# Patient Record
Sex: FEMALE | Race: WHITE | Employment: FULL TIME | URBAN - METROPOLITAN AREA
[De-identification: names, ages, dates, MRNs, and addresses within clinical notes are randomized per-mention and may not be internally consistent; named-entity substitution may affect disease eponyms.]

---

## 2021-09-26 ENCOUNTER — APPOINTMENT (OUTPATIENT)
Dept: GENERAL RADIOLOGY | Age: 46
DRG: 177 | End: 2021-09-26
Attending: EMERGENCY MEDICINE
Payer: COMMERCIAL

## 2021-09-26 ENCOUNTER — HOSPITAL ENCOUNTER (INPATIENT)
Age: 46
LOS: 3 days | Discharge: HOME | DRG: 177 | End: 2021-09-29
Attending: EMERGENCY MEDICINE | Admitting: FAMILY MEDICINE
Payer: COMMERCIAL

## 2021-09-26 DIAGNOSIS — U07.1 PNEUMONIA DUE TO COVID-19 VIRUS: Primary | ICD-10-CM

## 2021-09-26 DIAGNOSIS — J12.82 PNEUMONIA DUE TO COVID-19 VIRUS: Primary | ICD-10-CM

## 2021-09-26 DIAGNOSIS — R09.02 HYPOXIA: ICD-10-CM

## 2021-09-26 LAB
ALBUMIN SERPL-MCNC: 2.8 G/DL (ref 3.5–5)
ALBUMIN/GLOB SERPL: 0.7 {RATIO} (ref 1.2–3.5)
ALP SERPL-CCNC: 63 U/L (ref 50–136)
ALT SERPL-CCNC: 28 U/L (ref 12–65)
ANION GAP SERPL CALC-SCNC: 4 MMOL/L (ref 7–16)
AST SERPL-CCNC: 28 U/L (ref 15–37)
ATRIAL RATE: 102 BPM
BASOPHILS # BLD: 0 K/UL (ref 0–0.2)
BASOPHILS NFR BLD: 0 % (ref 0–2)
BILIRUB SERPL-MCNC: 0.3 MG/DL (ref 0.2–1.1)
BUN SERPL-MCNC: 9 MG/DL (ref 6–23)
CALCIUM SERPL-MCNC: 8.2 MG/DL (ref 8.3–10.4)
CALCULATED P AXIS, ECG09: 61 DEGREES
CALCULATED R AXIS, ECG10: -14 DEGREES
CALCULATED T AXIS, ECG11: 69 DEGREES
CHLORIDE SERPL-SCNC: 107 MMOL/L (ref 98–107)
CO2 SERPL-SCNC: 30 MMOL/L (ref 21–32)
CREAT SERPL-MCNC: 0.83 MG/DL (ref 0.6–1)
CRP SERPL-MCNC: 2.6 MG/DL (ref 0–0.9)
D DIMER PPP FEU-MCNC: 0.61 UG/ML(FEU)
DIAGNOSIS, 93000: NORMAL
DIFFERENTIAL METHOD BLD: ABNORMAL
EOSINOPHIL # BLD: 0 K/UL (ref 0–0.8)
EOSINOPHIL NFR BLD: 0 % (ref 0.5–7.8)
ERYTHROCYTE [DISTWIDTH] IN BLOOD BY AUTOMATED COUNT: 16.1 % (ref 11.9–14.6)
GLOBULIN SER CALC-MCNC: 4 G/DL (ref 2.3–3.5)
GLUCOSE SERPL-MCNC: 99 MG/DL (ref 65–100)
HCT VFR BLD AUTO: 39.7 % (ref 35.8–46.3)
HGB BLD-MCNC: 12 G/DL (ref 11.7–15.4)
IMM GRANULOCYTES # BLD AUTO: 0 K/UL (ref 0–0.5)
IMM GRANULOCYTES NFR BLD AUTO: 0 % (ref 0–5)
LYMPHOCYTES # BLD: 0.8 K/UL (ref 0.5–4.6)
LYMPHOCYTES NFR BLD: 28 % (ref 13–44)
MCH RBC QN AUTO: 24.1 PG (ref 26.1–32.9)
MCHC RBC AUTO-ENTMCNC: 30.2 G/DL (ref 31.4–35)
MCV RBC AUTO: 79.9 FL (ref 79.6–97.8)
MONOCYTES # BLD: 0.2 K/UL (ref 0.1–1.3)
MONOCYTES NFR BLD: 7 % (ref 4–12)
NEUTS SEG # BLD: 1.8 K/UL (ref 1.7–8.2)
NEUTS SEG NFR BLD: 64 % (ref 43–78)
NRBC # BLD: 0 K/UL (ref 0–0.2)
P-R INTERVAL, ECG05: 142 MS
PLATELET # BLD AUTO: 197 K/UL (ref 150–450)
PMV BLD AUTO: 9.5 FL (ref 9.4–12.3)
POTASSIUM SERPL-SCNC: 4.2 MMOL/L (ref 3.5–5.1)
PROCALCITONIN SERPL-MCNC: <0.05 NG/ML
PROT SERPL-MCNC: 6.8 G/DL (ref 6.3–8.2)
Q-T INTERVAL, ECG07: 320 MS
QRS DURATION, ECG06: 76 MS
QTC CALCULATION (BEZET), ECG08: 417 MS
RBC # BLD AUTO: 4.97 M/UL (ref 4.05–5.2)
SARS-COV-2, COV2: DETECTED
SARS-COV-2, COV2: NORMAL
SODIUM SERPL-SCNC: 141 MMOL/L (ref 136–145)
SPECIMEN SOURCE, FCOV2M: ABNORMAL
VENTRICULAR RATE, ECG03: 102 BPM
WBC # BLD AUTO: 2.8 K/UL (ref 4.3–11.1)

## 2021-09-26 PROCEDURE — 74011250636 HC RX REV CODE- 250/636: Performed by: FAMILY MEDICINE

## 2021-09-26 PROCEDURE — 85025 COMPLETE CBC W/AUTO DIFF WBC: CPT

## 2021-09-26 PROCEDURE — 94762 N-INVAS EAR/PLS OXIMTRY CONT: CPT

## 2021-09-26 PROCEDURE — 85379 FIBRIN DEGRADATION QUANT: CPT

## 2021-09-26 PROCEDURE — 65270000029 HC RM PRIVATE

## 2021-09-26 PROCEDURE — 99285 EMERGENCY DEPT VISIT HI MDM: CPT

## 2021-09-26 PROCEDURE — 74011250636 HC RX REV CODE- 250/636: Performed by: EMERGENCY MEDICINE

## 2021-09-26 PROCEDURE — 86140 C-REACTIVE PROTEIN: CPT

## 2021-09-26 PROCEDURE — 84145 PROCALCITONIN (PCT): CPT

## 2021-09-26 PROCEDURE — 93005 ELECTROCARDIOGRAM TRACING: CPT | Performed by: EMERGENCY MEDICINE

## 2021-09-26 PROCEDURE — 36415 COLL VENOUS BLD VENIPUNCTURE: CPT

## 2021-09-26 PROCEDURE — 96374 THER/PROPH/DIAG INJ IV PUSH: CPT

## 2021-09-26 PROCEDURE — 77010033711 HC HIGH FLOW OXYGEN

## 2021-09-26 PROCEDURE — 71045 X-RAY EXAM CHEST 1 VIEW: CPT

## 2021-09-26 PROCEDURE — U0005 INFEC AGEN DETEC AMPLI PROBE: HCPCS

## 2021-09-26 PROCEDURE — 80053 COMPREHEN METABOLIC PANEL: CPT

## 2021-09-26 PROCEDURE — 74011250637 HC RX REV CODE- 250/637: Performed by: FAMILY MEDICINE

## 2021-09-26 RX ORDER — SODIUM CHLORIDE 0.9 % (FLUSH) 0.9 %
5-40 SYRINGE (ML) INJECTION AS NEEDED
Status: DISCONTINUED | OUTPATIENT
Start: 2021-09-26 | End: 2021-09-29 | Stop reason: HOSPADM

## 2021-09-26 RX ORDER — ONDANSETRON 2 MG/ML
4 INJECTION INTRAMUSCULAR; INTRAVENOUS
Status: DISCONTINUED | OUTPATIENT
Start: 2021-09-26 | End: 2021-09-29 | Stop reason: HOSPADM

## 2021-09-26 RX ORDER — POLYETHYLENE GLYCOL 3350 17 G/17G
17 POWDER, FOR SOLUTION ORAL DAILY PRN
Status: DISCONTINUED | OUTPATIENT
Start: 2021-09-26 | End: 2021-09-29 | Stop reason: HOSPADM

## 2021-09-26 RX ORDER — DEXAMETHASONE SODIUM PHOSPHATE 100 MG/10ML
6 INJECTION INTRAMUSCULAR; INTRAVENOUS
Status: COMPLETED | OUTPATIENT
Start: 2021-09-26 | End: 2021-09-26

## 2021-09-26 RX ORDER — DEXAMETHASONE 4 MG/1
6 TABLET ORAL DAILY
Status: DISCONTINUED | OUTPATIENT
Start: 2021-09-26 | End: 2021-09-29 | Stop reason: HOSPADM

## 2021-09-26 RX ORDER — ONDANSETRON 4 MG/1
4 TABLET, ORALLY DISINTEGRATING ORAL
Status: DISCONTINUED | OUTPATIENT
Start: 2021-09-26 | End: 2021-09-29 | Stop reason: HOSPADM

## 2021-09-26 RX ORDER — ACETAMINOPHEN 650 MG/1
650 SUPPOSITORY RECTAL
Status: DISCONTINUED | OUTPATIENT
Start: 2021-09-26 | End: 2021-09-29 | Stop reason: HOSPADM

## 2021-09-26 RX ORDER — ENOXAPARIN SODIUM 100 MG/ML
40 INJECTION SUBCUTANEOUS EVERY 12 HOURS
Status: DISCONTINUED | OUTPATIENT
Start: 2021-09-26 | End: 2021-09-29 | Stop reason: HOSPADM

## 2021-09-26 RX ORDER — ACETAMINOPHEN 325 MG/1
650 TABLET ORAL
Status: DISCONTINUED | OUTPATIENT
Start: 2021-09-26 | End: 2021-09-29 | Stop reason: HOSPADM

## 2021-09-26 RX ORDER — SODIUM CHLORIDE 0.9 % (FLUSH) 0.9 %
5-40 SYRINGE (ML) INJECTION EVERY 8 HOURS
Status: DISCONTINUED | OUTPATIENT
Start: 2021-09-26 | End: 2021-09-29 | Stop reason: HOSPADM

## 2021-09-26 RX ADMIN — ENOXAPARIN SODIUM 40 MG: 40 INJECTION SUBCUTANEOUS at 16:56

## 2021-09-26 RX ADMIN — ACETAMINOPHEN 650 MG: 325 TABLET ORAL at 13:04

## 2021-09-26 RX ADMIN — Medication 10 ML: at 21:52

## 2021-09-26 RX ADMIN — DEXAMETHASONE 6 MG: 4 TABLET ORAL at 16:56

## 2021-09-26 RX ADMIN — DEXAMETHASONE SODIUM PHOSPHATE 6 MG: 10 INJECTION INTRAMUSCULAR; INTRAVENOUS at 11:15

## 2021-09-26 NOTE — ED PROVIDER NOTES
28-year-old female presents with increased shortness of breath with exertion related to COVID-19. She was diagnosed September 19 for severe home testing done at work. She does not have a copy of the results. She reports continued cough, generalized weakness, diarrhea, and chest heaviness. She has not been vaccinated. She reports oxygen saturations have been in the low 80s since yesterday. Positive For Covid-19  Associated symptoms: chest pain, congestion, cough, diarrhea and myalgias    Associated symptoms: no confusion, no headaches, no nausea, no rash and no vomiting         No past medical history on file. No past surgical history on file. No family history on file. Social History     Socioeconomic History    Marital status: Not on file     Spouse name: Not on file    Number of children: Not on file    Years of education: Not on file    Highest education level: Not on file   Occupational History    Not on file   Tobacco Use    Smoking status: Not on file   Substance and Sexual Activity    Alcohol use: Not on file    Drug use: Not on file    Sexual activity: Not on file   Other Topics Concern    Not on file   Social History Narrative    Not on file     Social Determinants of Health     Financial Resource Strain:     Difficulty of Paying Living Expenses:    Food Insecurity:     Worried About Running Out of Food in the Last Year:     [address] in the Last Year:    Transportation Needs:     Lack of Transportation (Medical):      Lack of Transportation (Non-Medical):    Physical Activity:     Days of Exercise per Week:     Minutes of Exercise per Session:    Stress:     Feeling of Stress :    Social Connections:     Frequency of Communication with Friends and Family:     Frequency of Social Gatherings with Friends and Family:     Attends Rastafarian Services:     Active Member of Clubs or Organizations:     Attends Club or Organization Meetings:     Marital Status: Intimate Partner Violence:     Fear of Current or Ex-Partner:     Emotionally Abused:     Physically Abused:     Sexually Abused: ALLERGIES: Keflex [cephalexin]    Review of Systems   Constitutional: Positive for fatigue. Negative for fever. HENT: Positive for congestion. Negative for hearing loss. Eyes: Negative for visual disturbance. Respiratory: Positive for cough and shortness of breath. Cardiovascular: Positive for chest pain. Gastrointestinal: Positive for diarrhea. Negative for abdominal pain, nausea and vomiting. Musculoskeletal: Positive for myalgias. Negative for back pain. Skin: Negative for rash. Neurological: Negative for headaches. Psychiatric/Behavioral: Negative for confusion. All other systems reviewed and are negative. Vitals:    09/26/21 1026 09/26/21 1028   BP: (!) 165/75    Pulse: (!) 109    Resp: 20    SpO2: (!) 86% 93%   Weight: 127 kg (280 lb)    Height: 5' 4\" (1.626 m)             Physical Exam  Vitals and nursing note reviewed. Constitutional:       Appearance: Normal appearance. She is obese. HENT:      Head: Normocephalic and atraumatic. Nose: Nose normal.      Mouth/Throat:      Mouth: Mucous membranes are moist.   Eyes:      Pupils: Pupils are equal, round, and reactive to light. Cardiovascular:      Rate and Rhythm: Regular rhythm. Tachycardia present. Pulmonary:      Effort: Pulmonary effort is normal.      Breath sounds: No stridor. Abdominal:      General: Abdomen is flat. Musculoskeletal:         General: No deformity. Normal range of motion. Cervical back: Normal range of motion and neck supple. Skin:     General: Skin is warm and dry. Neurological:      General: No focal deficit present. Mental Status: She is alert. Mental status is at baseline.    Psychiatric:         Mood and Affect: Mood normal.         Behavior: Behavior normal.          MDM  Number of Diagnoses or Management Options  Diagnosis management comments: Parts of this document were created using dragon voice recognition software. The chart has been reviewed but errors may still be present. I wore appropriate PPE throughout this patient's ED visit. Germán Aguilar MD, 11:04 AM    Given decadron. COVID swab ordered as she does not have a copy of report. Will admit for hypoxia. sats 86% upon arrival.       Amount and/or Complexity of Data Reviewed  Clinical lab tests: reviewed and ordered  Tests in the radiology section of CPT®: ordered and reviewed (XR CHEST PORT    Result Date: 9/26/2021  CHEST X-RAY, single portable view  9/26/2021 History: Covid positive. Shortness of breath and hypoxia. Technique: Single frontal view of the chest. Comparison: None. Findings: The cardiac silhouette is normal in respect to size. The lungs are expanded without evidence for pneumothorax. Mild multifocal infiltrates are seen in the lower lung fields, left greater than right, which favor the periphery of the lungs. 1.  Mild multifocal infiltrates in the lower lung fields, left greater than right, which favor the periphery of the lungs consistent with the history of Covid pneumonia. This report was made using voice transcription.  Despite my best efforts to avoid any, transcription errors may persist. If there is any question about the accuracy of the report or need for clarification, then please call [phone], or text me through perfectserv for clarification or correction.     )  Tests in the medicine section of CPT®: ordered and reviewed           Procedures

## 2021-09-26 NOTE — ED TRIAGE NOTES
Pt arrived via EMS from home with c/o COVID sx since last week. Pt reports dx on Sunday. Pt reports shob and weakness.  SpO 90% /70 HR 96 Temp 99.5

## 2021-09-26 NOTE — H&P
[facility] Hospitalist Note     Admit Date:  2021 10:29 AM   Name:  Thmopson Solano   Age:  42 y.o.  :  1975   MRN:  [id number]   PCP:  None  Treatment Team: Attending Provider: Meño Zepeda MD; Primary Nurse: Federico Romo RN    HPI/Subjective:   Patient is a 73-year-old female with no significant past medical history presented to ED with worsening shortness of breath. Patient reports symptoms started about a week ago and she was diagnosed with Covid on . She did not have a copy of the results. She has not been vaccinated. Complaining of generalized weakness, cough, congestion, chest tightness and diarrhea. Shortness of breath worsened today and she noted to have oxygen saturation at home in low 80s. Denies fever, chills, nausea, vomiting or abdominal pain. In the ED patient noted to have oxygen saturation 86% on room air, requiring 2 L nasal cannula to keep oxygen saturation above 90%. Labs noted for WBC 12.8, hemoglobin 12, platelet 650, BMP unremarkable. Procalcitonin negative, CRP 2.6. Chest x-ray read mild multifocal infiltrates in the lower lung fields left greater than right. Hospitalist consulted for admission. Assessment and Plan:     Acute hypoxia secondary to COVID-19 infection:  Symptoms started on   Follow-up on COVID-19 PCR  Oxygen saturation 86% on room air, requiring 2 L nasal cannula  Supplemental oxygen as needed  Start patient on Decadron for 10 days  Out of window for remdesivir  CRP 2.6, will check other inflammatory markers. If elevated, will consider baricitinib  Check D-dimer  Procalcitonin negative, hold off on antibiotics  Incentive spirometry    Morbid obesity, BMI 48:   Adds complexity inpatient care    Discharge planning: Admit patient to inpatient    DVT ppx ordered  Code status:  Full  Estimated LOS:  Greater than 2 midnights  Risk:  high    Hospital Problems as of 2021 Never Reviewed        Codes Class Noted - Resolved POA    Hypoxia ICD-10-CM: R09.02  ICD-9-CM: 799.02  9/26/2021 - Present Unknown        COVID-19 ICD-10-CM: U07.1  ICD-9-CM: 079.89  9/26/2021 - Present Unknown        BMI 45.0-49.9, adult [facility]) ICD-10-CM: V10.41  ICD-9-CM: V85.42  9/26/2021 - Present Unknown                10 systems reviewed and negative except as noted in HPI. No past medical history on file. No past surgical history on file. Allergies   Allergen Reactions    Keflex [Cephalexin] Rash      Social History     Tobacco Use    Smoking status: Not on file   Substance Use Topics    Alcohol use: Not on file      No family history on file. Family history reviewed and noncontributory. There is no immunization history on file for this patient. PTA Medications:  None       Objective:     Patient Vitals for the past 24 hrs:   Pulse Resp BP SpO2   09/26/21 1332 (!) 103  139/65 95 %   09/26/21 1202 94  (!) 147/76 95 %   09/26/21 1132 91  (!) 145/68 97 %   09/26/21 1102 98 26 132/65 92 %   09/26/21 1032 (!) 108 19 136/68 96 %   09/26/21 1028    93 %   09/26/21 1026 (!) 109 20 (!) 165/75 (!) 86 %     Oxygen Therapy  O2 Sat (%): 95 % (09/26/21 1332)  Pulse via Oximetry: 101 beats per minute (09/26/21 1332)  O2 Device: Nasal cannula (09/26/21 1028)  O2 Flow Rate (L/min): 2 l/min (09/26/21 1028)    Estimated body mass index is 48.06 kg/m² as calculated from the following:    Height as of this encounter: 5' 4\" (1.626 m). Weight as of this encounter: 127 kg (280 lb). No intake or output data in the 24 hours ending 09/26/21 1404    *Note that automatically entered I/Os may not be accurate; dependent on patient compliance with collection and accurate  by assistants. Physical Exam:  General:    Well nourished. Alert. Eyes:   Normal sclerae. Extraocular movements intact. HENT:  Normocephalic, atraumatic. Moist mucous membranes  CV:   RRR. No m/r/g. Lungs:  Decreased breath sounds at the bases  Abdomen: Soft, nontender, nondistended. Extremities: Warm and dry. No cyanosis or edema. Neurologic: CN II-XII grossly intact. Sensation intact. Skin:     No rashes or jaundice. Normal coloration  Psych:  Normal mood and affect. I reviewed the labs, imaging, EKGs, telemetry, and other studies done this admission. Data Reviewed:   Recent Results (from the past 24 hour(s))   CBC WITH AUTOMATED DIFF    Collection Time: 09/26/21 10:31 AM   Result Value Ref Range    WBC 2.8 (L) 4.3 - 11.1 K/uL    RBC 4.97 4.05 - 5.2 M/uL    HGB 12.0 11.7 - 15.4 g/dL    HCT 39.7 35.8 - 46.3 %    MCV 79.9 79.6 - 97.8 FL    MCH 24.1 (L) 26.1 - 32.9 PG    MCHC 30.2 (L) 31.4 - 35.0 g/dL    RDW 16.1 (H) 11.9 - 14.6 %    PLATELET 158 580 - 268 K/uL    MPV 9.5 9.4 - 12.3 FL    ABSOLUTE NRBC 0.00 0.0 - 0.2 K/uL    DF AUTOMATED      NEUTROPHILS 64 43 - 78 %    LYMPHOCYTES 28 13 - 44 %    MONOCYTES 7 4.0 - 12.0 %    EOSINOPHILS 0 (L) 0.5 - 7.8 %    BASOPHILS 0 0.0 - 2.0 %    IMMATURE GRANULOCYTES 0 0.0 - 5.0 %    ABS. NEUTROPHILS 1.8 1.7 - 8.2 K/UL    ABS. LYMPHOCYTES 0.8 0.5 - 4.6 K/UL    ABS. MONOCYTES 0.2 0.1 - 1.3 K/UL    ABS. EOSINOPHILS 0.0 0.0 - 0.8 K/UL    ABS. BASOPHILS 0.0 0.0 - 0.2 K/UL    ABS. IMM. GRANS. 0.0 0.0 - 0.5 K/UL   METABOLIC PANEL, COMPREHENSIVE    Collection Time: 09/26/21 10:31 AM   Result Value Ref Range    Sodium 141 136 - 145 mmol/L    Potassium 4.2 3.5 - 5.1 mmol/L    Chloride 107 98 - 107 mmol/L    CO2 30 21 - 32 mmol/L    Anion gap 4 (L) 7 - 16 mmol/L    Glucose 99 65 - 100 mg/dL    BUN 9 6 - 23 MG/DL    Creatinine 0.83 0.6 - 1.0 MG/DL    GFR est AA >60 >60 ml/min/1.73m2    GFR est non-AA >60 >60 ml/min/1.73m2    Calcium 8.2 (L) 8.3 - 10.4 MG/DL    Bilirubin, total 0.3 0.2 - 1.1 MG/DL    ALT (SGPT) 28 12 - 65 U/L    AST (SGOT) 28 15 - 37 U/L    Alk.  phosphatase 63 50 - 136 U/L    Protein, total 6.8 6.3 - 8.2 g/dL    Albumin 2.8 (L) 3.5 - 5.0 g/dL    Globulin 4.0 (H) 2.3 - 3.5 g/dL    A-G Ratio 0.7 (L) 1.2 - 3.5     C REACTIVE PROTEIN, QT Collection Time: 09/26/21 10:31 AM   Result Value Ref Range    C-Reactive protein 2.6 (H) 0.0 - 0.9 mg/dL   PROCALCITONIN    Collection Time: 09/26/21 10:31 AM   Result Value Ref Range    Procalcitonin <0.05 ng/mL   SARS-COV-2    Collection Time: 09/26/21 11:16 AM   Result Value Ref Range    SARS-CoV-2 Please find results under separate order         All Micro Results     Procedure Component Value Units Date/Time    SARS-COV-2, PCR [[id number]] Collected: 09/26/21 1116    Order Status: Completed Updated: 09/26/21 1238          Current Facility-Administered Medications   Medication Dose Route Frequency    acetaminophen (TYLENOL) tablet 650 mg  650 mg Oral Q6H PRN    Or    acetaminophen (TYLENOL) suppository 650 mg  650 mg Rectal Q6H PRN    remdesivir 200 mg in 0.9% sodium chloride 250 mL IVPB  200 mg IntraVENous ONCE    Followed by   Jeanmarie Munguia ON 9/27/2021] remdesivir 100 mg in 0.9% sodium chloride 250 mL IVPB  100 mg IntraVENous Q24H     No current outpatient medications on file. Other Studies:  No results found for this visit on 09/26/21. XR CHEST PORT    Result Date: 9/26/2021  CHEST X-RAY, single portable view  9/26/2021 History: Covid positive. Shortness of breath and hypoxia. Technique: Single frontal view of the chest. Comparison: None. Findings: The cardiac silhouette is normal in respect to size. The lungs are expanded without evidence for pneumothorax. Mild multifocal infiltrates are seen in the lower lung fields, left greater than right, which favor the periphery of the lungs. 1.  Mild multifocal infiltrates in the lower lung fields, left greater than right, which favor the periphery of the lungs consistent with the history of Covid pneumonia. This report was made using voice transcription.  Despite my best efforts to avoid any, transcription errors may persist. If there is any question about the accuracy of the report or need for clarification, then please call [phone], or text me through perfectserv for clarification or correction. [unfilled]       Part of this note was written by using a voice dictation software and the note has been proof read but may still contain some grammatical/other typographical errors.     Signed:  Sylvester Nava MD

## 2021-09-26 NOTE — PROGRESS NOTES
TRANSFER - IN REPORT:    Verbal report received from CLYDE Singer on Vladimire Adam  being received from ED for routine progression of care      Report consisted of patients Situation, Background, Assessment and   Recommendations(SBAR). Information from the following report(s) SBAR was reviewed with the receiving nurse. Opportunity for questions and clarification was provided. Assessment completed upon patients arrival to unit and care assumed.

## 2021-09-26 NOTE — PROGRESS NOTES
INITIAL SPIRITUAL ASSESSMENT     NOTED:      PATIENT IS BEING ADMITTED TO FLOOR FROM ER    [facility]    SISTER -  Aubrey Fay        WILL ASSESS HOW WE CAN BEST SERVE THIS FAMILY

## 2021-09-26 NOTE — ED NOTES
TRANSFER - OUT REPORT:    Verbal report given to Missael Bernstein RN on Maria Luisa Andrade  being transferred to [facility] for routine progression of care       Report consisted of patients Situation, Background, Assessment and   Recommendations(SBAR). Information from the following report(s) SBAR was reviewed with the receiving nurse. Lines:   Peripheral IV 09/26/21 Right Antecubital (Active)        Opportunity for questions and clarification was provided.       Patient transported with:   [email or web address]

## 2021-09-26 NOTE — PROGRESS NOTES
Pt awake in bed watching TV with 2LNC sating 93%. In NAD. Denies concerns/pain/complaints at this time.  Report to be given to night RN

## 2021-09-27 PROBLEM — J96.01 ACUTE HYPOXEMIC RESPIRATORY FAILURE DUE TO COVID-19 (HCC): Status: ACTIVE | Noted: 2021-09-27

## 2021-09-27 PROBLEM — U07.1 ACUTE HYPOXEMIC RESPIRATORY FAILURE DUE TO COVID-19 (HCC): Status: ACTIVE | Noted: 2021-09-27

## 2021-09-27 LAB
ALBUMIN SERPL-MCNC: 2.7 G/DL (ref 3.5–5)
ALBUMIN/GLOB SERPL: 0.7 {RATIO} (ref 1.2–3.5)
ALP SERPL-CCNC: 59 U/L (ref 50–136)
ALT SERPL-CCNC: 26 U/L (ref 12–65)
ANION GAP SERPL CALC-SCNC: 6 MMOL/L (ref 7–16)
AST SERPL-CCNC: 26 U/L (ref 15–37)
BASOPHILS # BLD: 0 K/UL (ref 0–0.2)
BASOPHILS NFR BLD: 0 % (ref 0–2)
BILIRUB SERPL-MCNC: 0.3 MG/DL (ref 0.2–1.1)
BUN SERPL-MCNC: 12 MG/DL (ref 6–23)
CALCIUM SERPL-MCNC: 8.3 MG/DL (ref 8.3–10.4)
CHLORIDE SERPL-SCNC: 107 MMOL/L (ref 98–107)
CO2 SERPL-SCNC: 27 MMOL/L (ref 21–32)
CREAT SERPL-MCNC: 0.71 MG/DL (ref 0.6–1)
CRP SERPL-MCNC: 2.1 MG/DL (ref 0–0.9)
D DIMER PPP FEU-MCNC: 0.37 UG/ML(FEU)
DIFFERENTIAL METHOD BLD: ABNORMAL
EOSINOPHIL # BLD: 0 K/UL (ref 0–0.8)
EOSINOPHIL NFR BLD: 0 % (ref 0.5–7.8)
ERYTHROCYTE [DISTWIDTH] IN BLOOD BY AUTOMATED COUNT: 15.9 % (ref 11.9–14.6)
FERRITIN SERPL-MCNC: 55 NG/ML (ref 8–388)
FIBRINOGEN PPP-MCNC: 462 MG/DL (ref 190–501)
GLOBULIN SER CALC-MCNC: 4.1 G/DL (ref 2.3–3.5)
GLUCOSE SERPL-MCNC: 119 MG/DL (ref 65–100)
HCT VFR BLD AUTO: 40.3 % (ref 35.8–46.3)
HGB BLD-MCNC: 12.3 G/DL (ref 11.7–15.4)
IMM GRANULOCYTES # BLD AUTO: 0 K/UL (ref 0–0.5)
IMM GRANULOCYTES NFR BLD AUTO: 0 % (ref 0–5)
INR PPP: 0.9
LYMPHOCYTES # BLD: 0.6 K/UL (ref 0.5–4.6)
LYMPHOCYTES NFR BLD: 23 % (ref 13–44)
MCH RBC QN AUTO: 24 PG (ref 26.1–32.9)
MCHC RBC AUTO-ENTMCNC: 30.5 G/DL (ref 31.4–35)
MCV RBC AUTO: 78.7 FL (ref 79.6–97.8)
MONOCYTES # BLD: 0.2 K/UL (ref 0.1–1.3)
MONOCYTES NFR BLD: 8 % (ref 4–12)
NEUTS SEG # BLD: 1.8 K/UL (ref 1.7–8.2)
NEUTS SEG NFR BLD: 69 % (ref 43–78)
NRBC # BLD: 0 K/UL (ref 0–0.2)
PLATELET # BLD AUTO: 240 K/UL (ref 150–450)
PMV BLD AUTO: 9.4 FL (ref 9.4–12.3)
POTASSIUM SERPL-SCNC: 4 MMOL/L (ref 3.5–5.1)
PROT SERPL-MCNC: 6.8 G/DL (ref 6.3–8.2)
PROTHROMBIN TIME: 12.1 SEC (ref 12.6–14.5)
RBC # BLD AUTO: 5.12 M/UL (ref 4.05–5.2)
SODIUM SERPL-SCNC: 140 MMOL/L (ref 136–145)
WBC # BLD AUTO: 2.6 K/UL (ref 4.3–11.1)

## 2021-09-27 PROCEDURE — 36415 COLL VENOUS BLD VENIPUNCTURE: CPT

## 2021-09-27 PROCEDURE — 65270000029 HC RM PRIVATE

## 2021-09-27 PROCEDURE — 74011250636 HC RX REV CODE- 250/636: Performed by: FAMILY MEDICINE

## 2021-09-27 PROCEDURE — 85025 COMPLETE CBC W/AUTO DIFF WBC: CPT

## 2021-09-27 PROCEDURE — 74011250637 HC RX REV CODE- 250/637: Performed by: FAMILY MEDICINE

## 2021-09-27 PROCEDURE — 80053 COMPREHEN METABOLIC PANEL: CPT

## 2021-09-27 PROCEDURE — 85379 FIBRIN DEGRADATION QUANT: CPT

## 2021-09-27 PROCEDURE — 85384 FIBRINOGEN ACTIVITY: CPT

## 2021-09-27 PROCEDURE — 82728 ASSAY OF FERRITIN: CPT

## 2021-09-27 PROCEDURE — 85610 PROTHROMBIN TIME: CPT

## 2021-09-27 PROCEDURE — 86140 C-REACTIVE PROTEIN: CPT

## 2021-09-27 RX ADMIN — ENOXAPARIN SODIUM 40 MG: 40 INJECTION SUBCUTANEOUS at 15:44

## 2021-09-27 RX ADMIN — Medication 10 ML: at 05:29

## 2021-09-27 RX ADMIN — Medication 10 ML: at 21:55

## 2021-09-27 RX ADMIN — ACETAMINOPHEN 650 MG: 325 TABLET ORAL at 18:26

## 2021-09-27 RX ADMIN — ENOXAPARIN SODIUM 40 MG: 40 INJECTION SUBCUTANEOUS at 03:19

## 2021-09-27 RX ADMIN — Medication 10 ML: at 13:02

## 2021-09-27 RX ADMIN — DEXAMETHASONE 6 MG: 4 TABLET ORAL at 13:04

## 2021-09-27 NOTE — ACP (ADVANCE CARE PLANNING)
Chart reviewed by ANDERSON KRAMER for ACP. Per chart review, no HCPOA or advance directive on file. Patient's NOK is spouse Britney Ma, who is currently incarcerated. Patient's preferred health decision maker is sister Yogesh Friday [phone]. ACP dicussion completed. Advance Care Planning     Advance Care Planning Activator (Inpatient)  Conversation Note      Date of ACP Conversation: 09/27/21     Conversation Conducted with:   Patient with Decision Making Capacity    ACP Activator: Alex Decision Maker:    Preferred Decision Maker is : Yogesh Friday- Sister [phone]. Click here to complete [address] including selection of the Healthcare Decision Maker Relationship (ie \"Primary\")  Today we documented desired Decision Maker(s), who is (are) NOT Legal Next of Kin. ACP documents are required for decision maker authority. Care Preferences    Ventilation: \"If you were in your present state of health and suddenly became very ill and were unable to breathe on your own, what would your preference be about the use of a ventilator (breathing machine) if it were available to you? \"      If patient would desire the use of a ventilator (breathing machine), answer \"yes\", if not \"no\":yes    \"If your health worsens and it becomes clear that your chance of recovery is unlikely, what would your preference be about the use of a ventilator (breathing machine) if it were available to you? \"     Would the patient desire the use of a ventilator (breathing machine)? YES      Resuscitation  \"CPR works best to restart the heart when there is a sudden event, like a heart attack, in someone who is otherwise healthy. Unfortunately, CPR does not typically restart the heart for people who have serious health conditions or who are very sick. \"    \"In the event your heart stopped as a result of an underlying serious health condition, would you want attempts to be made to restart your heart (answer \"yes\" for attempt to resuscitate) or would you prefer a natural death (answer \"no\" for do not attempt to resuscitate)? \" yes      [x] Yes  [] No   Educated Patient / Monica Geiger regarding differences between Advance Directives and portable DNR orders.     Length of ACP Conversation in minutes:  15    Conversation Outcomes:  [x] ACP discussion completed  [] Existing advance directive reviewed with patient; no changes to patient's previously recorded wishes     [] New Advance Directive completed   [] Portable Do Not Resuscitate prepared for Provider review and signature  [] POLST/POST/MOLST/MOST prepared for Provider review and signature      Follow-up plan:    [] Schedule follow-up conversation to continue planning  [] Referred individual to Provider for additional questions/concerns   [] Advised patient/agent/surrogate to review completed ACP document and update if needed with changes in condition, patient preferences or care setting     [x] This note routed to one or more involved healthcare providers

## 2021-09-27 NOTE — ROUTINE PROCESS
Bedside and Verbal and Written shift change report given to Roly Tran RN (oncoming nurse) by self Aquiles Ha nurse). Report included the following information SBAR, Kardex, Intake/Output, MAR and Recent Results.    Pt remains on 3L NC.

## 2021-09-27 NOTE — ROUTINE PROCESS
Bedside and Verbal shift change report given to self (oncoming nurse) by Sandrita Akbar RN (offgoing nurse). Report included the following information SBAR, Kardex, ED Summary, Intake/Output, MAR and Recent Results. Pt resting calmly in bed at shift change. No distress.

## 2021-09-27 NOTE — PROGRESS NOTES
Physician Progress Note      PATIENTCash Hurst  CSN #:                  [id number]  :                       1975  ADMIT DATE:       2021 10:29 AM  DISCH DATE:  RESPONDING  PROVIDER #:        Vidhi Jackson          QUERY TEXT:    Pt admitted with COVID-19 and noted to have WBC 2.6, , RR 25. If possible, please document in progress notes and discharge summary if you are evaluating and/or treating: The medical record reflects the following:  Risk Factors: COVID positive  on admission  Clinical Indicators: WBC 2.6, . RR 25, COVID 19 active,  Acute hypoxemic respiratory failure due to COVID-19    Treatment:  Decadron IV. Thank you. Ramirez Bliss RN CDI, CCS  My office phone [phone]  Options provided:  -- Sepsis present on admission due to COVID-19 infection  -- Sepsis not present on admission due to COVID-19 infection  -- Covid-19 infection without sepsis  -- Other - I will add my own diagnosis  -- Disagree - Not applicable / Not valid  -- Disagree - Clinically unable to determine / Unknown  -- Refer to Clinical Documentation Reviewer    PROVIDER RESPONSE TEXT:    This patient has Covid-19 infection without sepsis.     Query created by: Lucía Casanova on 2021 2:55 PM      Electronically signed by:  Vidhi Jackson 2021 4:45 PM

## 2021-09-27 NOTE — PROGRESS NOTES
Hospitalist Progress Note   Admit Date:  2021 10:29 AM   Name:  Ricardo Morrison   Age:  42 y.o. Sex:  female  :  1975   MRN:  [id number]   Room:  [facility]    Presenting Complaint: Positive For Covid-19    Reason(s) for Admission: Hypoxia [facility] Course & Interval History:   Patient is a morbidly obese 82-year-old female with no significant past medical history who presented to ED with worsening shortness of breath. Patient reports symptoms started about a week ago and she was diagnosed with Covid on . She did not have a copy of the results. Pt reports she works at a [address] [place] [address], not vaccinated. She thinks that she contracted COVID from her 6year old son who is also COVID positive. Pt admitted to worsening dyspnea on exertion and at home, O2 saturations were in the 80s. Upon presentation to the ER, pt's oxygen saturation was 86% on room air, requiring 2 L nasal cannula to keep oxygen saturation above 90%. Labs noted for WBC 12.8, hemoglobin 12, platelet 357, BMP unremarkable. Procalcitonin negative, CRP 2.6. Chest x-ray read mild multifocal infiltrates in the lower lung fields left greater than right. Pt was admitted for further mgmt. Subjective (21): \"I feel better with the oxygen. \"  Pleasant MORBIDLY OBESE 46y. o. WF sitting up in bed without acute / new complaints, on 3L reg flow via NC. Review of Systems:  10 systems reviewed and negative except as noted in HPI.       Assessment & Plan:   Principal Problem:    Acute hypoxemic respiratory failure due to COVID-19  - cont supplemental O2 3L; goal to maintain O2 saturations >90%, wean as tolerated  - pt committed to proning   - out of window for remdesivir   - does not warrant baricitinib   - cont decadron as dosed    Active Problems:    COVID-19 (2021)  - cont decadron  - cont supplemental O2  - f/u PCR  - if remains stable next 24hrs, check ambulatory saturations in AM to determine home O2 needs for potential dc home      Morbid Obesity / BMI 45.0-49.9, adult ([address] (9/26/2021)  - encourage wt loss        Dispo/Discharge Planning:    - anticipate 2 midnight hospitalization; if pt remains stable, check ambulatory saturations in AM for potential discharge home      Diet:  ADULT DIET Regular  DVT PPx: lovenox  Code status: Full Code    Hospital Problems as of 9/27/2021 Never Reviewed        Codes Class Noted - Resolved POA    Hypoxia ICD-10-CM: R09.02  ICD-9-CM: 799.02  9/26/2021 - Present Unknown        COVID-19 ICD-10-CM: U07.1  ICD-9-CM: 079.89  9/26/2021 - Present Unknown        BMI 45.0-49.9, adult [facility]) ICD-10-CM: A86.39  ICD-9-CM: V85.42  9/26/2021 - Present Unknown              Objective:     Patient Vitals for the past 24 hrs:   Temp Pulse Resp BP SpO2   09/27/21 1118 98.3 °F (36.8 °C) 91 25 (!) 143/83 (!) 89 %   09/27/21 0730 97.8 °F (36.6 °C) 79 20 125/68 91 %   09/27/21 0347 98 °F (36.7 °C) 79 18 120/66 92 %   09/26/21 2209 98.4 °F (36.9 °C) 92 18 122/77 92 %   09/26/21 2112     90 %   09/26/21 1940 99.3 °F (37.4 °C) 95 20 118/76 90 %   09/26/21 1643 99.2 °F (37.3 °C) 93 21 131/75 96 %   09/26/21 1523     96 %   09/26/21 1332  (!) 103  139/65 95 %   09/26/21 1202  94  (!) 147/76 95 %     Oxygen Therapy  O2 Sat (%): (!) 89 % (09/27/21 1118)  Pulse via Oximetry: 112 beats per minute (09/26/21 2112)  O2 Device: Nasal cannula (09/27/21 0741)  Skin Assessment: Clean, dry, & intact (09/27/21 0741)  Skin Protection for O2 Device: No (09/26/21 1523)  O2 Flow Rate (L/min): 3 l/min (09/27/21 0741)    Estimated body mass index is 48.06 kg/m² as calculated from the following:    Height as of this encounter: 5' 4\" (1.626 m). Weight as of this encounter: 127 kg (280 lb). Intake/Output Summary (Last 24 hours) at 9/27/2021 1133  Last data filed at 9/27/2021 0800  Gross per 24 hour   Intake 660 ml   Output    Net 660 ml         Physical Exam:     General:    Morbidly obese.   No overt distress  Head:  Normocephalic, atraumatic  Eyes:  Sclerae appear normal.  Pupils equally round. ENT:  Nares appear normal, no drainage. Moist oral mucosa  Neck:  No restricted ROM. Trachea midline neck short, obese  CV:   RRR. No m/r/g. No jugular venous distension. Lungs:   CTAB. No wheezing, rhonchi, or rales. Respirations even, unlabored  Abdomen: Bowel sounds present. Soft, nontender, nondistended. Extremities: No cyanosis or clubbing. No edema  Skin:     No rashes and normal coloration. Warm and dry. Neuro:  Cranial nerves II-XII grossly intact. Sensation intact  Psych:  Normal mood and affect. Alert and oriented x3    I have reviewed ordered lab tests and independently visualized imaging below:    Last 24hr Labs:  Recent Results (from the past 24 hour(s))   D DIMER    Collection Time: 09/26/21  3:04 PM   Result Value Ref Range    D DIMER 0.61 (H) <0.56 ug/ml(FEU)   CBC WITH AUTOMATED DIFF    Collection Time: 09/27/21  5:58 AM   Result Value Ref Range    WBC 2.6 (L) 4.3 - 11.1 K/uL    RBC 5.12 4.05 - 5.2 M/uL    HGB 12.3 11.7 - 15.4 g/dL    HCT 40.3 35.8 - 46.3 %    MCV 78.7 (L) 79.6 - 97.8 FL    MCH 24.0 (L) 26.1 - 32.9 PG    MCHC 30.5 (L) 31.4 - 35.0 g/dL    RDW 15.9 (H) 11.9 - 14.6 %    PLATELET 067 125 - 812 K/uL    MPV 9.4 9.4 - 12.3 FL    ABSOLUTE NRBC 0.00 0.0 - 0.2 K/uL    DF AUTOMATED      NEUTROPHILS 69 43 - 78 %    LYMPHOCYTES 23 13 - 44 %    MONOCYTES 8 4.0 - 12.0 %    EOSINOPHILS 0 (L) 0.5 - 7.8 %    BASOPHILS 0 0.0 - 2.0 %    IMMATURE GRANULOCYTES 0 0.0 - 5.0 %    ABS. NEUTROPHILS 1.8 1.7 - 8.2 K/UL    ABS. LYMPHOCYTES 0.6 0.5 - 4.6 K/UL    ABS. MONOCYTES 0.2 0.1 - 1.3 K/UL    ABS. EOSINOPHILS 0.0 0.0 - 0.8 K/UL    ABS. BASOPHILS 0.0 0.0 - 0.2 K/UL    ABS. IMM.  GRANS. 0.0 0.0 - 0.5 K/UL   METABOLIC PANEL, COMPREHENSIVE    Collection Time: 09/27/21  5:58 AM   Result Value Ref Range    Sodium 140 136 - 145 mmol/L    Potassium 4.0 3.5 - 5.1 mmol/L    Chloride 107 98 - 107 mmol/L CO2 27 21 - 32 mmol/L    Anion gap 6 (L) 7 - 16 mmol/L    Glucose 119 (H) 65 - 100 mg/dL    BUN 12 6 - 23 MG/DL    Creatinine 0.71 0.6 - 1.0 MG/DL    GFR est AA >60 >60 ml/min/1.73m2    GFR est non-AA >60 >60 ml/min/1.73m2    Calcium 8.3 8.3 - 10.4 MG/DL    Bilirubin, total 0.3 0.2 - 1.1 MG/DL    ALT (SGPT) 26 12 - 65 U/L    AST (SGOT) 26 15 - 37 U/L    Alk. phosphatase 59 50 - 136 U/L    Protein, total 6.8 6.3 - 8.2 g/dL    Albumin 2.7 (L) 3.5 - 5.0 g/dL    Globulin 4.1 (H) 2.3 - 3.5 g/dL    A-G Ratio 0.7 (L) 1.2 - 3.5     PROTHROMBIN TIME + INR    Collection Time: 09/27/21  5:58 AM   Result Value Ref Range    Prothrombin time 12.1 (L) 12.6 - 14.5 sec    INR 0.9     FIBRINOGEN    Collection Time: 09/27/21  5:58 AM   Result Value Ref Range    Fibrinogen 462 190 - 501 mg/dL   C REACTIVE PROTEIN, QT    Collection Time: 09/27/21  5:58 AM   Result Value Ref Range    C-Reactive protein 2.1 (H) 0.0 - 0.9 mg/dL   FERRITIN    Collection Time: 09/27/21  5:58 AM   Result Value Ref Range    Ferritin 55 8 - 388 NG/ML   D DIMER    Collection Time: 09/27/21  5:58 AM   Result Value Ref Range    D DIMER 0.37 <0.56 ug/ml(FEU)       All Micro Results     Procedure Component Value Units Date/Time    SARS-COV-2, PCR [[id number]]  (Abnormal) Collected: 09/26/21 1116    Order Status: Completed Specimen: Nasopharyngeal Updated: 09/26/21 3788     Specimen source Nasopharyngeal        SARS-CoV-2 Detected        Comment:      The specimen is POSITIVE for SARS-CoV-2, the novel coronavirus associated with COVID-19. This test has been authorized by the FDA under an Emergency Use Authorization (EUA) for use by authorized laboratories.         Fact sheet for Healthcare Providers: ConventionUpdate.co.nz  Fact sheet for Patients: https://fda.gov/media/292834/download       Methodology: RT-PCR  RESULTS VERIFIED, PHONED TO AND READ BACK BY  ED TANG @9402 ON [id number] [place]               Other Studies:  No results found.    Current Meds:  Current Facility-Administered Medications   Medication Dose Route Frequency    sodium chloride (NS) flush 5-40 mL  5-40 mL IntraVENous Q8H    sodium chloride (NS) flush 5-40 mL  5-40 mL IntraVENous PRN    acetaminophen (TYLENOL) tablet 650 mg  650 mg Oral Q6H PRN    Or    acetaminophen (TYLENOL) suppository 650 mg  650 mg Rectal Q6H PRN    polyethylene glycol (MIRALAX) packet 17 g  17 g Oral DAILY PRN    ondansetron (ZOFRAN ODT) tablet 4 mg  4 mg Oral Q8H PRN    Or    ondansetron (ZOFRAN) injection 4 mg  4 mg IntraVENous Q6H PRN    enoxaparin (LOVENOX) injection 40 mg  40 mg SubCUTAneous Q12H    dexAMETHasone (DECADRON) tablet 6 mg  6 mg Oral DAILY       Signed:  PANCHO Gonzalez Sa    Part of this note may have been written by using a voice dictation software. The note has been proof read but may still contain some grammatical/other typographical errors.

## 2021-09-27 NOTE — PROGRESS NOTES
Hourly rounds completed on patient, call light with in reach and bed low and locked. Pt remains on 3L NC with os sats above 90%. NAD noted during shift. Patient denies needs at this time. Will report to oncoming nurse.

## 2021-09-27 NOTE — PROGRESS NOTES
CM contacted patient via phone in room to complete assessment, due to COVID isolation. Demographic information verified by the patient. The patient lives with her two children, who are ages 8 and 6, in a two story home with 2 steps at the entrance. Patient confirmed she is employed with [employer]. Patient confirmed at baseline, the patient is independent with completing ADL's and drives. DME: NONE    Patient obtains her prescription medications from [address]  on [facility]. Patient voiced no difficulty with obtaining medications in the community. Discharge planning : PT/OT has not been consulted. Patient anticipates returning home when medically stable. Patient denies any discharge needs at this time. Please consult or notify CM if any needs shall arise. CM continues to follow plan of care. Care Management Interventions  PCP Verified by CM: Yes (Patient attends [facility]. )  Mode of Transport at Discharge: Other (see comment) (Family. )  Transition of Care Consult (CM Consult): Discharge Planning  Discharge Durable Medical Equipment: No  Physical Therapy Consult: No  Occupational Therapy Consult: No  Speech Therapy Consult: No  Support Systems: Child(lakeisha) (The patient lives with her two children. )  Confirm Follow Up Transport: Self  The Plan for Transition of Care is Related to the Following Treatment Goals : Return to Baseline. The Patient and/or Patient Representative was Provided with a Choice of Provider and Agrees with the Discharge Plan?: Yes  Name of the Patient Representative Who was Provided with a Choice of Provider and Agrees with the Discharge Plan: Patient.    [place] Resource Information Provided?: No  Discharge Location  Discharge Placement: Home

## 2021-09-28 LAB
CRP SERPL-MCNC: 1.3 MG/DL (ref 0–0.9)
D DIMER PPP FEU-MCNC: 0.4 UG/ML(FEU)
PROCALCITONIN SERPL-MCNC: <0.05 NG/ML

## 2021-09-28 PROCEDURE — 85379 FIBRIN DEGRADATION QUANT: CPT

## 2021-09-28 PROCEDURE — 97530 THERAPEUTIC ACTIVITIES: CPT

## 2021-09-28 PROCEDURE — 84145 PROCALCITONIN (PCT): CPT

## 2021-09-28 PROCEDURE — 74011250636 HC RX REV CODE- 250/636: Performed by: FAMILY MEDICINE

## 2021-09-28 PROCEDURE — 65270000029 HC RM PRIVATE

## 2021-09-28 PROCEDURE — 86140 C-REACTIVE PROTEIN: CPT

## 2021-09-28 PROCEDURE — 97161 PT EVAL LOW COMPLEX 20 MIN: CPT

## 2021-09-28 PROCEDURE — 74011250637 HC RX REV CODE- 250/637: Performed by: FAMILY MEDICINE

## 2021-09-28 PROCEDURE — 36415 COLL VENOUS BLD VENIPUNCTURE: CPT

## 2021-09-28 RX ADMIN — ACETAMINOPHEN 650 MG: 325 TABLET ORAL at 09:43

## 2021-09-28 RX ADMIN — ENOXAPARIN SODIUM 40 MG: 40 INJECTION SUBCUTANEOUS at 14:40

## 2021-09-28 RX ADMIN — Medication 10 ML: at 21:03

## 2021-09-28 RX ADMIN — Medication 10 ML: at 14:40

## 2021-09-28 RX ADMIN — DEXAMETHASONE 6 MG: 4 TABLET ORAL at 14:40

## 2021-09-28 RX ADMIN — Medication 10 ML: at 05:06

## 2021-09-28 RX ADMIN — ENOXAPARIN SODIUM 40 MG: 40 INJECTION SUBCUTANEOUS at 02:45

## 2021-09-28 NOTE — PROGRESS NOTES
Hospitalist Progress Note   Admit Date:  2021 10:29 AM   Name:  Nicole Suh   Age:  42 y.o. Sex:  female  :  1975   MRN:  [id number]   Room:  [facility]/    Presenting Complaint: Positive For Covid-19    Reason(s) for Admission: Hypoxia [facility] Course & Interval History:   Patient is a morbidly obese 44-year-old female with no significant past medical history who presented to ED with worsening shortness of breath. Patient reports symptoms started about a week ago and she was diagnosed with Covid on . She did not have a copy of the results. Pt reports she works at a [address] [place] [address], not vaccinated. She thinks that she contracted COVID from her 6year old son who is also COVID positive. Pt admitted to worsening dyspnea on exertion and at home, O2 saturations were in the 80s. Upon presentation to the ER, pt's oxygen saturation was 86% on room air, requiring 2 L nasal cannula to keep oxygen saturation above 90%. Labs noted for WBC 12.8, hemoglobin 12, platelet 948, BMP unremarkable. Procalcitonin negative, CRP 2.6. Chest x-ray read mild multifocal infiltrates in the lower lung fields left greater than right. Pt was admitted for further mgmt. 2021: stable on 3L, tolerant to proning    Subjective (21):  \"I am not as short of breath when I move around. \"  Pleasant MORBIDLY OBESE 46y. o. WF sitting up in chair without new complaints    Review of Systems:  10 systems reviewed and negative except as noted in HPI.       Assessment & Plan:   Principal Problem:    Acute hypoxemic respiratory failure due to COVID-19 (NOT VACCINATED)  - cont supplemental O2 3L; goal to maintain O2 saturations >90%, wean as tolerated  - pt committed to proning   - out of window for remdesivir   - does not warrant baricitinib (CRP 1.3, 3L via supplemental O2)  - cont decadron as dosed  - check ambulatory saturations  - if remains clinically stable possible dc home next 24hrs; after further discussions with patient she is now willing to get vaccinated     Active Problems:    COVID-19 (9/26/2021)  - cont decadron  - cont supplemental O2  - PCR +, CRP 1.3      Morbid Obesity / BMI 45.0-49.9, adult ([facility] (9/26/2021)  - encourage wt loss        Dispo/Discharge Planning:    - anticipate 2 midnight hospitalization; check ambulatory saturations today, possibly home next 24hrs      Diet:  ADULT DIET Regular  DVT PPx: lovenox  Code status: Full Code    Hospital Problems as of 9/28/2021 Never Reviewed        Codes Class Noted - Resolved POA    * (Principal) Acute hypoxemic respiratory failure due to COVID-19 [facility]) ICD-10-CM: U07.1, J96.01  ICD-9-CM: 518.81, 079.89, 799.02  9/27/2021 - Present Unknown        Hypoxia ICD-10-CM: R09.02  ICD-9-CM: 799.02  9/26/2021 - Present Unknown        COVID-19 ICD-10-CM: U07.1  ICD-9-CM: 079.89  9/26/2021 - Present Unknown        BMI 45.0-49.9, adult ([facility] ICD-10-CM: E59.79  ICD-9-CM: V85.42  9/26/2021 - Present Unknown              Objective:     Patient Vitals for the past 24 hrs:   Temp Pulse Resp BP SpO2   09/28/21 0712 98.7 °F (37.1 °C) 92 20 122/73 91 %   09/28/21 0337 98.4 °F (36.9 °C) 79 20 118/65 90 %   09/27/21 2330 98.1 °F (36.7 °C) 91 20 132/84 91 %   09/27/21 2111 98.5 °F (36.9 °C) 83 20 119/75 93 %   09/27/21 1554 99.2 °F (37.3 °C) 90 20 132/70 94 %     Oxygen Therapy  O2 Sat (%): 91 % (09/28/21 0712)  Pulse via Oximetry: 112 beats per minute (09/26/21 2112)  O2 Device: Nasal cannula (09/28/21 0705)  Skin Assessment: Clean, dry, & intact (09/28/21 1931)  Skin Protection for O2 Device: No (09/26/21 1523)  O2 Flow Rate (L/min): 3 l/min (09/28/21 0705)    Estimated body mass index is 48.06 kg/m² as calculated from the following:    Height as of this encounter: 5' 4\" (1.626 m). Weight as of this encounter: 127 kg (280 lb).     Intake/Output Summary (Last 24 hours) at 9/28/2021 1130  Last data filed at 9/28/2021 1001  Gross per 24 hour   Intake 970 ml   Output  Net 970 ml         Physical Exam:     General:    Morbidly obese. No overt distress  Head:  Normocephalic, atraumatic  Eyes:  Sclerae appear normal.  Pupils equally round. ENT:  Nares appear normal, no drainage. Moist oral mucosa  Neck:  No restricted ROM. Trachea midline neck short, obese  CV:   RRR. No m/r/g. No jugular venous distension. Lungs:   CTAB. No wheezing, rhonchi, or rales. Respirations even, unlabored  Abdomen: Bowel sounds present. Soft, nontender, nondistended. Extremities: No cyanosis or clubbing. No edema  Skin:     No rashes and normal coloration. Warm and dry. Neuro:  Cranial nerves II-XII grossly intact. Sensation intact  Psych:  Normal mood and affect. Alert and oriented x3    I have reviewed ordered lab tests and independently visualized imaging below:    Last 24hr Labs:  Recent Results (from the past 24 hour(s))   C REACTIVE PROTEIN, QT    Collection Time: 09/28/21  4:31 AM   Result Value Ref Range    C-Reactive protein 1.3 (H) 0.0 - 0.9 mg/dL   D DIMER    Collection Time: 09/28/21  4:31 AM   Result Value Ref Range    D DIMER 0.40 <0.56 ug/ml(FEU)       All Micro Results     Procedure Component Value Units Date/Time    SARS-COV-2, PCR [[id number]]  (Abnormal) Collected: 09/26/21 1116    Order Status: Completed Specimen: Nasopharyngeal Updated: 09/26/21 1727     Specimen source Nasopharyngeal        SARS-CoV-2 Detected        Comment:      The specimen is POSITIVE for SARS-CoV-2, the novel coronavirus associated with COVID-19. This test has been authorized by the FDA under an Emergency Use Authorization (EUA) for use by authorized laboratories. Fact sheet for Healthcare Providers: ConventionUpdate.co.nz  Fact sheet for Patients: https://fda.gov/media/790554/download       Methodology: RT-PCR  RESULTS VERIFIED, PHONED TO AND READ BACK BY  ED TANG @1647 ON [id number] [place]               Other Studies:  No results found.     Current Meds:  Current Facility-Administered Medications   Medication Dose Route Frequency    sodium chloride (NS) flush 5-40 mL  5-40 mL IntraVENous Q8H    sodium chloride (NS) flush 5-40 mL  5-40 mL IntraVENous PRN    acetaminophen (TYLENOL) tablet 650 mg  650 mg Oral Q6H PRN    Or    acetaminophen (TYLENOL) suppository 650 mg  650 mg Rectal Q6H PRN    polyethylene glycol (MIRALAX) packet 17 g  17 g Oral DAILY PRN    ondansetron (ZOFRAN ODT) tablet 4 mg  4 mg Oral Q8H PRN    Or    ondansetron (ZOFRAN) injection 4 mg  4 mg IntraVENous Q6H PRN    enoxaparin (LOVENOX) injection 40 mg  40 mg SubCUTAneous Q12H    dexAMETHasone (DECADRON) tablet 6 mg  6 mg Oral DAILY       Signed:  PANCHO Hoffman    Part of this note may have been written by using a voice dictation software. The note has been proof read but may still contain some grammatical/other typographical errors.

## 2021-09-28 NOTE — PROGRESS NOTES
Hourly rounds completed on patient, call light with in reach and bed low and locked. Pt remains on 3L NC with o2 sats above 90%. NAD noted during shift. Patient denies needs at this time. Will report to oncoming nurse.

## 2021-09-28 NOTE — PROGRESS NOTES
CM received consult to assist with DME. Per chart review, patient requiring 3 liters. PT/OT consulted this day. CM will assist with home oxygen , if needed at time of discharge. Patient will need o2 qualifer placed within 48 hours of discharge, to assist with home oxygen. CM will continue to monitor plan of care and therapy's recommendations. CM continues to follow.

## 2021-09-28 NOTE — PROGRESS NOTES
ACUTE PHYSICAL THERAPY GOALS:  (Developed with and agreed upon by patient and/or caregiver. )    LTG:  (1.)Ms. Josr Paz will move from supine to sit and sit to supine , scoot up and down and roll side to side in bed with INDEPENDENCE within 7 treatment day(s). (2.)Ms. Josr Paz will transfer from bed to chair and chair to bed with MODIFIED INDEPENDENCE using the least restrictive device within 7 treatment day(s). (3.)Ms. Josr Paz will ambulate with MODIFIED INDEPENDENCE for 200 feet with the least restrictive device within 7 treatment day(s). (4.)Ms. Josr Paz will participate in therapeutic activity/exercises x 25 minutes for increased activity tolerance within 7 treatment days. (5.)Ms. Josr Paz will ascend and descend 3 stairs using R hand rail(s) with MODIFIED INDEPENDENCE to improve functional mobility and safety within 7 day(s).     ________________________________________________________________________________________________      PHYSICAL THERAPY ASSESSMENT: Initial Assessment and PM PT Treatment Day # 1      Yessenia Paz is a 42 y.o. female   PRIMARY DIAGNOSIS: Acute hypoxemic respiratory failure due to COVID-19 [facility])  Hypoxia [R09.02]       Reason for Referral:    ICD-10: Treatment Diagnosis: Difficulty in walking, Not elsewhere classified (R26.2)  INPATIENT: Payor: [facility] / Plan: [facility] [employer] HMO/CHOICE PLUS/POS / Product Type: HMO /     ASSESSMENT:     REHAB RECOMMENDATIONS:   Recommendation to date pending progress:  Setting:   Outpatient Therapy  Equipment:    None     PRIOR LEVEL OF FUNCTION:  (Prior to Hospitalization) INITIAL/CURRENT LEVEL OF FUNCTION:  (Most Recently Demonstrated)   Bed Mobility:   Independent  Sit to Stand:   Independent  Transfers:   Independent  Gait/Mobility:   Independent Bed Mobility:   Not tested  Sit to Stand:  [email or web address] [employer] Assistance  Transfers:   Supervision  Gait/Mobility:   Supervision     ASSESSMENT:  Ms. Josr Paz presents to PT with [facility] AROM and WFL strength in B LEs. Pt given SBA for STS transfer and supervision for ambulation. She desaturated on 3 L/min so placed on 5 L/min to maintain SpO2 > 90%. Pt also participated in seated exercises with cuing for breathing/pacing. Ms. Barbra Ram could benefit from skilled PT as she is currently functioning below her baseline. SUBJECTIVE:   Ms. Barbra Ram states, \"My brother is watching them. \"    SOCIAL HISTORY/LIVING ENVIRONMENT: Lives with children and independent with ambulation PTA;   Works at [employer]: Child(Zo[place]) (The patient lives with her two children. )  OBJECTIVE:     PAIN: VITAL SIGNS: LINES/DRAINS:   Pre Treatment: Pain Screen  Pain Scale 1: Numeric (0 - 10)  Pain Intensity 1: 0  Post Treatment: 0 Vital Signs  O2 Sat (%): 90 % (87% after ambulating)  O2 Device: Nasal cannula  O2 Flow Rate (L/min): 3 l/min none  O2 Device: Nasal cannula     GROSS EVALUATION:  B LE Within Functional Limits Abnormal/ Functional Abnormal/ Non-Functional (see comments) Not Tested Comments:   AROM [x] [] [] []    PROM [] [] [] []    Strength [x] [] [] []    Balance [x] [] [] []    Posture [] [] [] []    Sensation [] [] [] []    Coordination [] [] [] []    Tone [] [] [] []    Edema [] [] [] []    Activity Tolerance [] [] [x] [] Desaturated with walking to 87%    [] [] [] []      COGNITION/  PERCEPTION: Intact Impaired   (see comments) Comments:   Orientation [x] []    Vision [x] []    Hearing [x] []    Command Following [x] []    Safety Awareness [x] []     [] []      MOBILITY: I Mod I S SBA CGA Min Mod Max Total  NT x2 Comments:   Bed Mobility    Rolling [] [] [] [] [] [] [] [] [] [] []    Supine to Sit [] [] [] [] [] [] [] [] [] [] []    Scooting [] [] [] [] [] [] [] [] [] [] []    Sit to Supine [] [] [] [] [] [] [] [] [] [] []    Transfers    Sit to Stand [] [] [] [x] [] [] [] [] [] [] []    Bed to Chair [] [] [] [] [] [] [] [] [] [] []    Stand to Sit [] [] [] [x] [] [] [] [] [] [] []    I=Independent, Mod I=Modified Independent, S=Supervision, SBA=Standby Assistance, CGA=Contact Guard Assistance,   Min=Minimal Assistance, Mod=Moderate Assistance, Max=Maximal Assistance, Total=Total Assistance, NT=Not Tested  GAIT: I Mod I S SBA CGA Min Mod Max Total  NT x2 Comments:   Level of Assistance [] [] [x] [] [] [] [] [] [] [] []    Distance 20 ft x 2 + 40 ft    DME none    Gait Quality Decreased gait speed    Weightbearing Status N/A     I=Independent, Mod I=Modified Independent, S=Supervision, SBA=Standby Assistance, CGA=Contact Guard Assistance,   Min=Minimal Assistance, Mod=Moderate Assistance, Max=Maximal Assistance, Total=Total Assistance, NT=Not Tested    [place] Form       How much difficulty does the patient currently have. .. Unable A Lot A Little None   1. Turning over in bed (including adjusting bedclothes, sheets and blankets)? [] 1   [] 2   [] 3   [x] 4   2. Sitting down on and standing up from a chair with arms ( e.g., wheelchair, bedside commode, etc.)   [] 1   [] 2   [] 3   [x] 4   3. Moving from lying on back to sitting on the side of the bed? [] 1   [] 2   [] 3   [x] 4   How much help from another person does the patient currently need. .. Total A Lot A Little None   4. Moving to and from a bed to a chair (including a wheelchair)? [] 1   [] 2   [x] 3   [] 4   5. Need to walk in hospital room? [] 1   [] 2   [x] 3   [] 4   6. Climbing 3-5 steps with a railing? [] 1   [] 2   [x] 3   [] 4   © 2007, Trustees of [facility] [employer], under license to [employer]. All rights reserved     Score:  Initial: 21 Most Recent: X (Date: -- )    Interpretation of Tool:  Represents activities that are increasingly more difficult (i.e. Bed mobility, Transfers, Gait).     PLAN:   FREQUENCY/DURATION: PT Plan of Care: 3 times/week for duration of hospital stay or until stated goals are met, whichever comes first.    PROBLEM LIST:   (Skilled intervention is medically necessary to address:)  1. Decreased Activity Tolerance  2. Decreased Gait Ability   INTERVENTIONS PLANNED:   (Benefits and precautions of physical therapy have been discussed with the patient.)  1. Therapeutic Activity  2. Therapeutic Exercise/HEP  3. Neuromuscular Re-education  4. Gait Training  5. Education     TREATMENT:     EVALUATION: Low Complexity : (Untimed Charge)    TREATMENT:   ($$ Therapeutic Activity: 23-37 mins    )  Therapeutic Activity (23 Minutes): Therapeutic activity included Transfer Training, Ambulation on level ground, Standing balance and seated exercises to improve functional Mobility, Strength and Activity tolerance.     TREATMENT GRID:   Date:  9/28/21 Date:   Date:     Activity/Exercise Parameters Parameters Parameters   APs 1 x 20 B     LAQ 1 x 15 B     Seated marching 1 x 15 B     Hip ABD/ADD 1 x 15 B                             AFTER TREATMENT POSITION/PRECAUTIONS:  Chair, Needs within reach and RN notified    INTERDISCIPLINARY COLLABORATION:  RN/PCT and PT/PTA    TOTAL TREATMENT DURATION:  PT Patient Time In/Time Out  Time In: 1334  Time Out: [id number] Flavio Rivera PT, DPT

## 2021-09-28 NOTE — PROGRESS NOTES
Pt is A/O x 4. C/O mild headache earlier during shift. PRN meds administered with relief noted. Pt continues on 3L NC. Pt desats when walking. Pt is in bed now resting. She has been sitting up in the chair most of the shift. Pt gets up ad lou and calls for assistance when needed. Will continue to monitor and provide care.

## 2021-09-29 ENCOUNTER — HOME HEALTH ADMISSION (OUTPATIENT)
Dept: HOME HEALTH SERVICES | Facility: HOME HEALTH | Age: 46
End: 2021-09-29
Payer: COMMERCIAL

## 2021-09-29 VITALS
RESPIRATION RATE: 20 BRPM | HEART RATE: 91 BPM | TEMPERATURE: 98.7 F | BODY MASS INDEX: 47.8 KG/M2 | SYSTOLIC BLOOD PRESSURE: 127 MMHG | OXYGEN SATURATION: 90 % | HEIGHT: 64 IN | WEIGHT: 280 LBS | DIASTOLIC BLOOD PRESSURE: 80 MMHG

## 2021-09-29 PROCEDURE — 77010033678 HC OXYGEN DAILY

## 2021-09-29 PROCEDURE — 94761 N-INVAS EAR/PLS OXIMETRY MLT: CPT

## 2021-09-29 PROCEDURE — 97165 OT EVAL LOW COMPLEX 30 MIN: CPT

## 2021-09-29 PROCEDURE — 97530 THERAPEUTIC ACTIVITIES: CPT

## 2021-09-29 PROCEDURE — 74011250636 HC RX REV CODE- 250/636: Performed by: FAMILY MEDICINE

## 2021-09-29 RX ORDER — DEXAMETHASONE 6 MG/1
6 TABLET ORAL DAILY
Qty: 7 TABLET | Refills: 0 | Status: SHIPPED | OUTPATIENT
Start: 2021-09-29 | End: 2021-10-06

## 2021-09-29 RX ADMIN — ENOXAPARIN SODIUM 40 MG: 40 INJECTION SUBCUTANEOUS at 14:08

## 2021-09-29 RX ADMIN — Medication 5 ML: at 14:09

## 2021-09-29 RX ADMIN — Medication 10 ML: at 05:44

## 2021-09-29 RX ADMIN — DEXAMETHASONE 6 MG: 4 TABLET ORAL at 14:08

## 2021-09-29 RX ADMIN — ENOXAPARIN SODIUM 40 MG: 40 INJECTION SUBCUTANEOUS at 02:01

## 2021-09-29 NOTE — PROGRESS NOTES
ACUTE PHYSICAL THERAPY GOALS:  (Developed with and agreed upon by patient and/or caregiver. )  LTG:  (1.)Ms. Dasia Tai will move from supine to sit and sit to supine , scoot up and down and roll side to side in bed with INDEPENDENCE within 7 treatment day(s). (2.)Ms. Dasia Tai will transfer from bed to chair and chair to bed with MODIFIED INDEPENDENCE using the least restrictive device within 7 treatment day(s). (3.)Ms. Dasia Tai will ambulate with MODIFIED INDEPENDENCE for 200 feet with the least restrictive device within 7 treatment day(s). (4.)Ms. Dasia Tai will participate in therapeutic activity/exercises x 25 minutes for increased activity tolerance within 7 treatment days. (5.)Ms. Dasia Tai will ascend and descend 3 stairs using R hand rail(s) with MODIFIED INDEPENDENCE to improve functional mobility and safety within 7 day(s).       PHYSICAL THERAPY: Daily Note Treatment Day # 2    Maria Luisa Andrade is a 42 y.o. female   PRIMARY DIAGNOSIS: Acute hypoxemic respiratory failure due to COVID-19 ([facility] [address]  Hypoxia [R09.02]         ASSESSMENT:     REHAB RECOMMENDATIONS: CURRENT LEVEL OF FUNCTION:  (Most Recently Demonstrated)   Recommendation to date pending progress:  Setting:   Outpatient Therapy  Equipment:    None Bed Mobility:   Not tested  Sit to Stand:   Supervision  Transfers:   Supervision  Gait/Mobility:   Supervision     ASSESSMENT:  Ms. Dasia Tai presents seated up in bedside chair and is agreeable to therapy. She is on 4L of O2 upon entering room and SpO2 is reading 88%. Pt increased to 5L for transfers and SpO2 still not recovering with stand so pt is increased to 6L. She is able to maintain 88-92% with ambulation and encouragement for pursed lip breathing. Pt educated on importance of decreasing her anxious breathing to assist with her SpO2 levels during ambulation. PT reviewed ways to conserve energy upon return home as well.  CM was contacted about pt SpO2 levels as pt currently has DC orders but will need increased O2 upon return home. PT will continue to see to assist with increased activity tolerance and functional mobility. SUBJECTIVE:   Ms. Gomez Bold states, \"I am a little nervous. \"    SOCIAL HISTORY/ LIVING ENVIRONMENT: refer to eval  Support Systems: Child(lakeisha) (The patient lives with her two children. )  OBJECTIVE:     PAIN: VITAL SIGNS: LINES/DRAINS:   Pre Treatment: Pain Screen  Pain Scale 1: Numeric (0 - 10)  Pain Intensity 1: 0  Post Treatment: 0   none  O2 Device: Hi flow nasal cannula     MOBILITY: I Mod I S SBA CGA Min Mod Max Total  NT x2 Comments:   Bed Mobility    Rolling [] [] [] [] [] [] [] [] [] [x] []    Supine to Sit [] [] [] [] [] [] [] [] [] [x] []    Scooting [] [] [] [] [] [] [] [] [] [x] []    Sit to Supine [] [] [] [] [] [] [] [] [] [x] []    Transfers    Sit to Stand [] [] [x] [] [] [] [] [] [] [] []    Bed to Chair [] [] [x] [] [] [] [] [] [] [] []    Stand to Sit [] [] [x] [] [] [] [] [] [] [] []    I=Independent, Mod I=Modified Independent, S=Supervision, SBA=Standby Assistance, CGA=Contact Guard Assistance,   Min=Minimal Assistance, Mod=Moderate Assistance, Max=Maximal Assistance, Total=Total Assistance, NT=Not Tested    BALANCE: Good Fair+ Fair Fair- Poor NT Comments   Sitting Static [x] [] [] [] [] []    Sitting Dynamic [x] [] [] [] [] []              Standing Static [x] [] [] [] [] []    Standing Dynamic [x] [] [] [] [] []      GAIT: I Mod I S SBA CGA Min Mod Max Total  NT x2 Comments:   Level of Assistance [] [] [x] [] [] [] [] [] [] [] []    Distance 75' x 2    DME None    Gait Quality Slow steps    Weightbearing  Status N/A     I=Independent, Mod I=Modified Independent, S=Supervision, SBA=Standby Assistance, CGA=Contact Guard Assistance,   Min=Minimal Assistance, Mod=Moderate Assistance, Max=Maximal Assistance, Total=Total Assistance, NT=Not Tested    PLAN:   FREQUENCY/DURATION: PT Plan of Care: 3 times/week for duration of hospital stay or until stated goals are met, whichever comes first.  TREATMENT:     TREATMENT:   ($$ Therapeutic Activity: 23-37 mins    )  Therapeutic Activity (23 Minutes): Therapeutic activity included Transfer Training, Ambulation on level ground, Sitting balance  and Standing balance to improve functional Mobility, Strength and Activity tolerance.     TREATMENT GRID:  N/A    AFTER TREATMENT POSITION/PRECAUTIONS:  Chair, Needs within reach and RN notified    INTERDISCIPLINARY COLLABORATION:  RN/PCT, PT/PTA, OT/MATTA and RN Case Manager/     TOTAL TREATMENT DURATION:  PT Patient Time In/Time Out  Time In: 1333  Time Out: [address] [place], [place]

## 2021-09-29 NOTE — PROGRESS NOTES
Oxygen Qualifier       Room air: SpO2 with O2 and liter flow   Resting SpO2  83%  86% on 1L, 88% on 2L, 91% on 3L   Ambulating SpO2   87% on 3L, 89% on 4L, 92% on 5L       Completed by:    Ignacio Lynn, RT

## 2021-09-29 NOTE — PROGRESS NOTES
Pt is up in the chair at this time. She is feeling anxious and teary about going home with fear that she won't be able to breath. Nurse spoke with pt about controlling her anxiety which aids in controlling her breathing. Pt states she understands. Pt agrees to call family to discuss transportation home. Will continue to monitor and provide care.

## 2021-09-29 NOTE — PROGRESS NOTES
PIV removed. Pt is up in the chair waiting for transport. Discharge instructions given and pt understands. No questions as this time.

## 2021-09-29 NOTE — PROGRESS NOTES
09/29/21 1033   Resting (Room Air)   SpO2 87   Resting (On O2)   SpO2 90   O2 Device Nasal cannula   O2 Flow Rate (l/min) 4 l/min   During Walk (Room Air)   Walk/Assistance Device Ambulation   During Walk (On O2)   SpO2 92   O2 Device Nasal cannula   O2 Flow Rate (l/min) 5 l/min   Walk/Assistance Device Ambulation   Rate of Dyspnea 0

## 2021-09-29 NOTE — DISCHARGE SUMMARY
Hospitalist Discharge Summary   Admit Date:  2021 10:29 AM   DC Note date: 2021  Name:  Dorene Banegas   Age:  42 y.o. Sex:  female  :  1975   MRN:  [id number]   Room:  [facility]  PCP:  None    Presenting Complaint: Positive For Covid-19    Initial Admission Diagnosis: Hypoxia [R09.02]     Problem List for this Hospitalization:  Hospital Problems as of 2021 Never Reviewed        Codes Class Noted - Resolved POA    * (Principal) Acute hypoxemic respiratory failure due to COVID-19 [facility]) ICD-10-CM: U07.1, J96.01  ICD-9-CM: 518.81, 079.89, 799.02  2021 - Present Unknown        Hypoxia ICD-10-CM: R09.02  ICD-9-CM: 799.02  2021 - Present Unknown        COVID-19 ICD-10-CM: U07.1  ICD-9-CM: 079.89  2021 - Present Unknown        [facility] 45.[facility]49.9[facility] ICD-10-CM: C13.74  ICD-9-CM: V85.42  2021 - Present Unknown            Did Patient have Sepsis (YES OR NO): no      Hospital Course:  Patient without significant past medical history. She presented with worsening shortness of breath. She was diagnosed with Covid infection in . She has not had Covid vaccination. Patient was found to have oxygen saturation 86% on room air on admission. She requires oxygen supplementation via nasal cannula. Chest x-ray showed multifocal infiltrates consistent with Covid infection pneumonia. Patient has been treated with Decadron. Remdesivir is not indicated since her symptoms is out of window for therapeutic treatment. And baricitinib is not warranted due to her not requiring high flow oxygen. Patient is being discharged in stable condition. She tolerates ambulation, with use of some oxygen supplementation via cannula up to 4 to 5 L/min. Disposition: Home or Self Care  Diet: ADULT DIET Regular  Code Status: Full Code    Follow Up Orders: Follow-up Appointments   Procedures    FOLLOW UP VISIT Appointment in: Other (Specify) See your primary doctor in 3-5 days. See your primary doctor in 3-5 days. Standing Status:   Standing     Number of Occurrences:   1     Order Specific Question:   Appointment in     Answer: Other (Specify)       Follow-up Information     Follow up With Specialties Details Why Contact Info    None    None (395) Patient stated that they have no PCP            Suggested initial follow up labs/diagnostics (ultimately defer to outpatient provider): Follow-up with primary doctor    Time spent in patient discharge and coordination 32 minutes. Plan was discussed with patient and care team.  All questions answered. Patient was stable at time of discharge. Instructions given to call a physician or return if any concerns. Discharge Info:   Current Discharge Medication List      START taking these medications    Details   dexAMETHasone (DECADRON) 6 mg tablet Take 1 Tablet by mouth daily for 7 days. Qty: 7 Tablet, Refills: 0  Start date: 9/29/2021, End date: 10/6/2021             Procedures done this admission:  * No surgery found *    Consults this admission:  None    Echocardiogram/EKG results:  No results found for this or any previous visit. EKG Results     Procedure 720 Value Units Date/Time    EKG 12 LEAD INITIAL [[id number]] Collected: 09/26/21 1029    Order Status: Completed Updated: 09/26/21 1632     Ventricular Rate 102 BPM      Atrial Rate 102 BPM      P-R Interval 142 ms      QRS Duration 76 ms      Q-T Interval 320 ms      QTC Calculation (Bezet) 417 ms      Calculated P Axis 61 degrees      Calculated R Axis -14 degrees      Calculated T Axis 69 degrees      Diagnosis --       Sinus tachycardia  Otherwise normal ECG  No previous ECGs available  Confirmed by Damian Laws ([id number]) on 9/26/2021 4:31:58 PM            Diagnostic Imaging/Tests:   XR CHEST PORT    Result Date: 9/26/2021  CHEST X-RAY, single portable view  9/26/2021 History: Covid positive. Shortness of breath and hypoxia.  Technique: Single frontal view of the chest. Comparison: None. Findings: The cardiac silhouette is normal in respect to size. The lungs are expanded without evidence for pneumothorax. Mild multifocal infiltrates are seen in the lower lung fields, left greater than right, which favor the periphery of the lungs. 1.  Mild multifocal infiltrates in the lower lung fields, left greater than right, which favor the periphery of the lungs consistent with the history of Covid pneumonia. This report was made using voice transcription. Despite my best efforts to avoid any, transcription errors may persist. If there is any question about the accuracy of the report or need for clarification, then please call [phone], or text me through [employer] for clarification or correction. All Micro Results     Procedure Component Value Units Date/Time    SARS-COV-2, PCR [[id number]]  (Abnormal) Collected: 09/26/21 1116    Order Status: Completed Specimen: Nasopharyngeal Updated: 09/26/21 1727     Specimen source Nasopharyngeal        SARS-CoV-2 Detected        Comment:      The specimen is POSITIVE for SARS-CoV-2, the novel coronavirus associated with COVID-19. This test has been authorized by the FDA under an Emergency Use Authorization (EUA) for use by authorized laboratories.         Fact sheet for Healthcare Providers: ConventionUpdate.co.nz  Fact sheet for Patients: https://fda.gov/media/505975/download       Methodology: RT-PCR  RESULTS VERIFIED, PHONED TO AND READ BACK BY  ED TANG @9068 ON [id number] [place]               Labs: Results:       BMP, Mg, Phos Recent Labs     09/27/21  0558      K 4.0      CO2 27   AGAP 6*   BUN 12   CREA 0.71   CA 8.3   *      CBC Recent Labs     09/27/21  0558   WBC 2.6*   RBC 5.12   HGB 12.3   HCT 40.3      GRANS 69   LYMPH 23   EOS 0*   MONOS 8   BASOS 0   IG 0   ANEU 1.8   ABL 0.6   BROWN 0.0   ABM 0.2   ABB 0.0   AIG 0.0      LFT Recent Labs     09/27/21  0558   ALT 26 AP 59   TP 6.8   ALB 2.7*   GLOB 4.1*   AGRAT 0.7*      Cardiac Testing No results found for: BNPP, BNP, CPK, RCK1, RCK2, RCK3, RCK4, CKMB, CKNDX, CKND1, TROPT, TROIQ   Coagulation Tests Lab Results   Component Value Date/Time    Prothrombin time 12.1 (L) 09/27/2021 05:58 AM    INR 0.9 09/27/2021 05:58 AM      A1c No results found for: HBA1C, HZA2UBAT   Lipid Panel No results found for: CHOL, CHOLPOCT, CHOLX, CHLST, CHOLV, 813750, HDL, HDLP, LDL, LDLC, DLDLP, 008394, VLDLC, VLDL, TGLX, TRIGL, TRIGP, TGLPOCT, CHHD, CHHDX   Thyroid Panel No results found for: TSH, T4, FT4, TT3, T3U, TSHEXT     Most Recent UA No results found for: COLOR, APPRN, REFSG, RIVERA, PROTU, GLUCU, KETU, BILU, BLDU, UROU, YUMIKO, LEUKU, WBCU, RBCU, UEPI, BACTU, CASTS, UCRY, MUCUS, UCOM       All Labs from Last 24 Hrs:  Recent Results (from the past 24 hour(s))   PROCALCITONIN    Collection Time: 09/28/21  1:34 PM   Result Value Ref Range    Procalcitonin <0.05 ng/mL       Current Med List in Hospital:   Current Facility-Administered Medications   Medication Dose Route Frequency    sodium chloride (NS) flush 5-40 mL  5-40 mL IntraVENous Q8H    sodium chloride (NS) flush 5-40 mL  5-40 mL IntraVENous PRN    acetaminophen (TYLENOL) tablet 650 mg  650 mg Oral Q6H PRN    Or    acetaminophen (TYLENOL) suppository 650 mg  650 mg Rectal Q6H PRN    polyethylene glycol (MIRALAX) packet 17 g  17 g Oral DAILY PRN    ondansetron (ZOFRAN ODT) tablet 4 mg  4 mg Oral Q8H PRN    Or    ondansetron (ZOFRAN) injection 4 mg  4 mg IntraVENous Q6H PRN    enoxaparin (LOVENOX) injection 40 mg  40 mg SubCUTAneous Q12H    dexAMETHasone (DECADRON) tablet 6 mg  6 mg Oral DAILY       Allergies   Allergen Reactions    Keflex [Cephalexin] Rash       There is no immunization history on file for this patient.     Recent Vital Data:  Patient Vitals for the past 24 hrs:   Temp Pulse Resp BP SpO2   09/29/21 1124 98.5 °F (36.9 °C) 89  126/66 90 %   09/29/21 1010     90 % 09/29/21 1006     (!) 78 %   09/29/21 0720 98.3 °F (36.8 °C) 85 25 119/81 91 %   09/29/21 0354 98.2 °F (36.8 °C) 83 20 134/76 93 %   09/28/21 2324 99.3 °F (37.4 °C) 86 20 139/84 92 %   09/28/21 2005 98.1 °F (36.7 °C) 95 21 128/66 91 %   09/28/21 1629 99.2 °F (37.3 °C) 93 25 121/74 92 %   09/28/21 1334     90 %     Oxygen Therapy  O2 Sat (%): 90 % (09/29/21 1124)  Pulse via Oximetry: 91 beats per minute (09/29/21 0705)  O2 Device: Hi flow nasal cannula (09/29/21 1010)  Skin Assessment: Clean, dry, & intact (09/28/21 4707)  Skin Protection for O2 Device: No (09/26/21 1523)  O2 Flow Rate (L/min): 5 l/min (09/29/21 1010)    Estimated body mass index is 48.06 kg/m² as calculated from the following:    Height as of this encounter: 5' 4\" (1.626 m). Weight as of this encounter: 127 kg (280 lb). Intake/Output Summary (Last 24 hours) at 9/29/2021 1212  Last data filed at 9/29/2021 0907  Gross per 24 hour   Intake 1380 ml   Output    Net 1380 ml         Physical Exam:    Visit Vitals  /66   Pulse 89   Temp 98.5 °F (36.9 °C)   Resp 25   Ht 5' 4\" (1.626 m)   Wt 127 kg (280 lb)   SpO2 90%   BMI 48.06 kg/m²        General:          Well nourished. mild shortness of breath when speaking, or ambulating. on oxygen cannula. Head:               Normocephalic, atraumatic  Eyes:               Sclerae appear normal.    ENT:                Nares appear normal, no drainage. Moist oral mucosa  Neck:               Trachea midline            CV:                  No jugular venous distension. Lungs:             No audible wheezing from observation. Respirations even, no distress. Extremities:     No cyanosis or clubbing. No edema  Skin:                No rashes and normal coloration. Warm and dry. Neuro:             No localized weakness   psych:              Normal mood and affect.   Alert and oriented x3       Signed:  Juanito Flores MD    Part of this note may have been written by using a voice dictation software. The note has been proof read but may still contain some grammatical/other typographical errors.

## 2021-09-29 NOTE — PROGRESS NOTES
ACUTE OT GOALS:  (Developed with and agreed upon by patient and/or caregiver.)  1) Patient will complete lower body bathing and dressing with modified independence and adaptive equipment as needed. 2) Patient will complete toileting with modified independence. 3) Patient will complete functional transfers with moidifed independence and adaptive equipment as needed. 4) Patient will tolerate at least 30 minutes of OT activity with as needed rest breaks while maintaining O2 sats >90%. 5) Patient will verbalize at least 3 energy conservation technique to utilize during ADL/IADL. Timeframe: 7 visits       OCCUPATIONAL THERAPY ASSESSMENT: Initial Assessment and Daily Note OT Treatment Day # 1    Yessenia Bob is a 42 y.o. female   PRIMARY DIAGNOSIS: Acute hypoxemic respiratory [facility]  Hypoxia [R09.02]       Reason for Referral:  Decreased activity tolerance secondary to COVID-19  ICD-10: Treatment Diagnosis: Generalized Muscle Weakness (M62.81)  INPATIENT: Payor: [facility] / Plan: [facility] HMO/CHOICE PLUS/POS / Product Type: HMO /   ASSESSMENT:     REHAB RECOMMENDATIONS:   Recommendation to date pending progress:  Setting:   No further skilled therapy (pending improvement in respiratory status)   Equipment:    To Be Determined     PRIOR LEVEL OF FUNCTION:  (Prior to Hospitalization)  INITIAL/CURRENT LEVEL OF FUNCTION:  (Based on today's evaluation)   Bathing:   Independent  Dressing:   Independent  Feeding/Grooming:   Independent  Toileting:   Independent  Functional Mobility:   Independent Bathing:   Minimal Assistance  Dressing:   Minimal Assistance  Feeding/Grooming:   Set Up  Toileting:   Minimal Assistance  Functional Mobility:   Contact Guard Assistance     ASSESSMENT:  Ms. Trupti Bob is a 42year old female with PMH of obesity admitted with SOB due to COVID-19.  At baseline patient lives with her 2 kids (8, 5 y/o) and is independent with ADLs, working. Today on 4L O2, sats dropped to 78% with activity. Required instruction on deep breathing and 5L O2 during seated rest break to return to 90%. Patient reports feeling anxious regarding oxygen requirement. Educated on energy conservation during ADL/IADL. She is functioning below her baseline due to decreased endurance related to respiratory status. Will follow for acute OT. SUBJECTIVE:   Ms. Elva Vega states, \"Is this normal??.\"    SOCIAL HISTORY/LIVING ENVIRONMENT: Lives with children, ages 8 and 6. Also has a supportive boyfriend. 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 Independent, drives.    Support Systems: Child(lakeisha) (The patient lives with her two children. )    OBJECTIVE:     PAIN: VITAL SIGNS: LINES/DRAINS:   Pre Treatment: Pain Screen  Pain Scale 1: Numeric (0 - 10)  Pain Intensity 1: 0  Post Treatment: 0 Vital Signs  O2 Sat (%): 90 %  O2 Device: Hi flow nasal cannula  O2 Flow Rate (L/min): 5 l/min     Dropped to 78% on 4L with activity  IV  O2 Device: Hi flow nasal cannula     GROSS EVALUATION:  BUE Within Functional Limits Abnormal/ Functional Abnormal/ Non-Functional (see comments) Not Tested Comments:   AROM [x] [] [] []    PROM [] [] [] [x]    Strength [x] [] [] []    Balance [x] [] [] []    Posture [x] [] [] []    Sensation [] [] [] [x]    Coordination [] [] [] [x]    Tone [] [] [] [x]    Edema [] [] [] [x]    Activity Tolerance [] [] [x] [] desats on 4L with activity     [] [] [] []      COGNITION/  PERCEPTION: Intact Impaired   (see comments) Comments:   Orientation [x] []    Vision [x] []    Hearing [x] []    Judgment/ Insight [x] []    Attention [x] []    Memory [x] []    Command Following [x] []    Emotional Regulation [] [x] Anxious     [] []      ACTIVITIES OF DAILY LIVING: I Mod I S SBA CGA Min Mod Max Total NT Comments   BASIC ADLs:              Bathing/ Showering [] [] [] [] [] [] [] [] [] []    Toileting [] [] [] [] [] [] [] [] [] []    Dressing [] [] [] [] [] [] [] [] [] [] Feeding [] [] [] [] [] [] [] [] [] []    Grooming [] [] [] [] [] [] [] [] [] []    Personal Device Care [x] [] [] [] [] [] [] [] [] []    Functional Mobility [] [] [] [] [x] [] [] [] [] []    I=Independent, Mod I=Modified Independent, S=Supervision, SBA=Standby Assistance, CGA=Contact Guard Assistance,   Min=Minimal Assistance, Mod=Moderate Assistance, Max=Maximal Assistance, Total=Total Assistance, NT=Not Tested    MOBILITY: I Mod I S SBA CGA Min Mod Max Total  NT x2 Comments:   Supine to sit [] [] [] [] [] [] [] [] [] [x] []    Sit to supine [] [] [] [] [] [] [] [] [] [] []    Sit to stand [] [] [] [] [x] [] [] [] [] [] []    Bed to chair [] [] [] [] [x] [] [] [] [] [] []    I=Independent, Mod I=Modified Independent, S=Supervision, SBA=Standby Assistance, CGA=Contact Guard Assistance,   Min=Minimal Assistance, Mod=Moderate Assistance, Max=Maximal Assistance, Total=Total Assistance, NT=Not Tested    [facility] AM-PAC 6 Clicks   Daily Activity Inpatient Short Form        How much help from another person does the patient currently need. .. Total A Lot A Little None   1. Putting on and taking off regular lower body clothing? [] 1   [] 2   [x] 3   [] 4   2. Bathing (including washing, rinsing, drying)? [] 1   [] 2   [x] 3   [] 4   3. Toileting, which includes using toilet, bedpan or urinal?   [] 1   [] 2   [x] 3   [] 4   4. Putting on and taking off regular upper body clothing? [] 1   [] 2   [x] 3   [] 4   5. Taking care of personal grooming such as brushing teeth? [] 1   [] 2   [x] 3   [] 4   6. Eating meals? [] 1   [] 2   [x] 3   [] 4   © 2007, Trustees of [facility], under license to [employer]. All rights reserved     Score:  Initial: 18 Most Recent: X (Date: -- )   Interpretation of Tool:  Represents activities that are increasingly more difficult (i.e. Bed mobility, Transfers, Gait).     PLAN:   FREQUENCY/DURATION: OT Plan of Care: 3 times/week for duration of hospital stay or until stated goals are met, whichever comes first.    PROBLEM LIST:   (Skilled intervention is medically necessary to address:)  1. Decreased ADL/Functional Activities  2. Decreased Activity Tolerance  3. Decreased Balance  4. Decreased Transfer Abilities   INTERVENTIONS PLANNED:   (Benefits and precautions of occupational therapy have been discussed with the patient.)  1. Self Care Training  2. Therapeutic Activity  3. Therapeutic Exercise/HEP  4. Neuromuscular Re-education  5. Education     TREATMENT:     EVALUATION: Low Complexity : (Untimed Charge)    TREATMENT:   ( $$ Therapeutic Activity: 8-22 mins   )  Therapeutic Activity (8 Minutes): Therapeutic activity included Transfer Training, Ambulation on level ground, Sitting balance , Standing balance and instruction on deep breathing, rest breaks, and energy conservation techniques to improve functional Activity tolerance and independence.     TREATMENT GRID:  N/A    AFTER TREATMENT POSITION/PRECAUTIONS:  Chair, Needs within reach and RN notified    INTERDISCIPLINARY COLLABORATION:  RN/PCT, OT/MATTA and RT    TOTAL TREATMENT DURATION:  OT Patient Time In/Time Out  Time In: 1006  Time Out: Fabiola Macedo [address], OTR/L

## 2021-09-29 NOTE — PROGRESS NOTES
Chart reviewed by CM for discharge planning. The patient is medically stable for discharge. CM spoke with patient via phone regarding discharge needs. Per PT Eval 9/28, the patient has been recommended for outpatient therapy. Per OT eval this day no further skilled therapy. Patient prefers CM arrange [facility] therapy, rather than outpatient therapy. CM was receptive to the patient's request. Referral placed with [facility], per patient request. Hospitalist - MD Elder Garcia will follow for [facility] orders for 7 days only. CM sent referral to [facility] requesting next available appointment for follow up care and to assist with following [facility] services. CM notified Jameson Melton and Chip via e-mail. Patient requiring o2. CM faxed all necessary information to [facility]     Patient to discharge home with [facility] and home oxygen provided by [facility]  CM provided portable o2 tank for discharge. Family to transport the patient home. Patient will inform staff, when family member is reached, to transport patient home. No needs or concerns voiced at this time. Please consult or notify CM if any needs shall arise. CM remains available. Care Management Interventions  PCP Verified by CM: Yes (Referral sent to [facility].  )  Mode of Transport at Discharge: Other (see comment) (Family. )  Transition of Care Consult (CM Consult): Discharge Planning, [address]: Yes  Discharge Durable Medical Equipment: No  Physical Therapy Consult: Yes  Occupational Therapy Consult: Yes  Speech Therapy Consult: No  Support Systems: Child(lakeisha) (The patient lives with her two children. )  Confirm Follow Up Transport: Self  The Plan for Transition of Care is Related to the Following Treatment Goals : Return to Baseline.    The Patient and/or Patient Representative was Provided with a Choice of Provider and Agrees with the Discharge Plan?: Yes  Name of the Patient Representative Who was Provided with a Choice of Provider and Agrees with the Discharge Plan: Patient.    [place] Resource Information Provided?: No  Discharge Location  Discharge Placement: Home with home health

## 2021-09-29 NOTE — PROGRESS NOTES
CM was notified by PT [facility], patient required 6L with ambulation and with recovery after ambulation. CM requested new RT qualifier be re-ordered, as patient will now require a 10 liter concentrator. CM notified staff member Reyna Parnell, with [facility]   to fax this information, when available. CM remains available. Updated: RT qualifier repeated by RT Davonte Holt. Patient remained stable on 5L with ambulation. Information faxed to [facility]  remains available.

## 2021-09-29 NOTE — DISCHARGE INSTRUCTIONS
DISCHARGE SUMMARY from Nurse    PATIENT INSTRUCTIONS:    After general anesthesia or intravenous sedation, for 24 hours or while taking prescription Narcotics:  · Limit your activities  · Do not drive and operate hazardous machinery  · Do not make important personal or business decisions  · Do  not drink alcoholic beverages  · If you have not urinated within 8 hours after discharge, please contact your surgeon on call. Report the following to your surgeon:  · Excessive pain, swelling, redness or odor of or around the surgical area  · Temperature over 100.5  · Nausea and vomiting lasting longer than 4 hours or if unable to take medications  · Any signs of decreased circulation or nerve impairment to extremity: change in color, persistent  numbness, tingling, coldness or increase pain  · Any questions    What to do at Home:  Recommended activity: Activity as tolerated. *  Please give a list of your current medications to your Primary Care Provider. *  Please update this list whenever your medications are discontinued, doses are      changed, or new medications (including over-the-counter products) are added. *  Please carry medication information at all times in case of emergency situations. These are general instructions for a healthy lifestyle:    No smoking/ No tobacco products/ Avoid exposure to second hand smoke  Surgeon General's Warning:  Quitting smoking now greatly reduces serious risk to your health. Obesity, smoking, and sedentary lifestyle greatly increases your risk for illness    A healthy diet, regular physical exercise & weight monitoring are important for maintaining a healthy lifestyle    You may be retaining fluid if you have a history of heart failure or if you experience any of the following symptoms:  Weight gain of 3 pounds or more overnight or 5 pounds in a week, increased swelling in our hands or feet or shortness of breath while lying flat in bed.   Please call your doctor as soon as you notice any of these symptoms; do not wait until your next office visit. The discharge information has been reviewed with the patient. The patient verbalized understanding. Discharge medications reviewed with the patientPatient Education   Patient Education        Oxygen Therapy: Care Instructions  Your Care Instructions     Oxygen therapy helps you get more oxygen into your lungs and bloodstream. You may use it if you have a disease that makes it hard to breathe, such as COPD, pulmonary fibrosis (scarring of the lungs), or heart failure. Oxygen therapy can make it easier for you to breathe and can reduce your heart's workload. Some people need extra oxygen all the time. Others need it from time to time throughout the day or overnight. A doctor will prescribe how much oxygen you need and how often to use it. To breathe the oxygen, most people use a nasal cannula (say \"VIKKI-yuh-gregorio\"). This is a thin tube with two prongs that fit just inside your nose. People who need a lot of oxygen may need to use a mask that fits over the nose and mouth. Follow-up care is a key part of your treatment and safety. Be sure to make and go to all appointments, and call your doctor if you are having problems. It's also a good idea to know your test results and keep a list of the medicines you take. How can you care for yourself at home? To help yourself  · Using oxygen may dry out your nose or lips. Use water-based lubricants on your lips or nostrils. Do not use an oil-based product like petroleum jelly. · If you use a nasal cannula, the tubing may rub under your nostrils and around your ears. To keep your skin from getting sore, tuck some gauze under the tubing. Use a water-based lotion on rubbed areas. · Do not use alcohol or take drugs that relax you, because they will slow your breathing rate. · Keep track of how much oxygen is in the tank, and reorder before it runs out.  If a holiday is coming up or you expect bad weather, order in advance or make your regular order larger. · You may need extra oxygen when you travel to high altitudes or travel by plane. Ask your doctor about this. · If you are getting oxygen directly to your windpipe through an opening in your neck, your doctor will teach you how to care for the equipment. To make sure oxygen is flowing  · Check the flow by holding your mask or cannula up to your ear and listening for the \"hiss\" of airflow. · If you have a nasal cannula, dip the prongs in a glass of water. If you see bubbles, oxygen is coming through. · Check your pressure gauge or contents indicator. · If you use an oxygen concentrator, make sure it is turned on and plugged in. If you use a cylinder, make sure the valve is open. · Look for kinks, blockages, or water in the tubing. Be sure the tubing is connected to the oxygen source. · Do not change your oxygen flow rate. Your doctor sets this at the correct level. Higher flow rates usually do not help and can increase the risk of harmful carbon dioxide buildup in the blood. To be safe  · Do not leave cords or tubing running across an area where you or someone else may trip on it. · Do not let oxygen containers get hot. Store them in a cool place where there is airflow. Do not leave them in a car trunk or a hot vehicle. · Keep oxygen containers upright. Make sure they do not fall over and get damaged. Try securing the tanks in a sturdy container or securing them with a rope or a chain. · Watch for signs of oxygen leaks. If you hear a loud hissing from your container or if it empties too fast, stay away from the container. Open windows right away and call the company that brought the oxygen system to your home. · Do not use oxygen around anything that could spark or easily cause a fire. ? Do not smoke or let others smoke while you are using oxygen. Put up \"no smoking\" signs in your home.   ? Do not use oxygen near open flames, such as candles, fireplaces, gas stoves, or hot water heaters. Do not use it near electric razors, hair dryers, heating pads, or anything that may spark. ? Keep a working fire extinguisher in your home where it is easy to get to.  ? If a fire starts, turn off the oxygen right away and leave the house. ? If you have an oxygen concentrator, do not use it if the cord looks damaged. Do not use an extension cord to plug it in. Do not plug it into an outlet that has other appliances plugged into it. To care for the equipment  · Follow the directions that come with the equipment for using and caring for it. · Wash your cannula or mask with a liquid soap and warm water 1 or 2 times a week. Replace them every 2 to 4 weeks. · If you have a cold, change the nasal prongs when your cold symptoms are done. · If you have an oxygen concentrator, unplug the unit and wipe down the cabinet with a damp cloth daily. Clean the air filter at least 2 times a week. Where can you learn more? Go to http://www.[employer]/  Enter E117 in the search box to learn more about \"Oxygen Therapy: Care Instructions. \"  Current as of: July 6, 2021               Content Version: 13.0  © 2006-2021 [employer]. Care instructions adapted under license by [employer] (which disclaims liability or warranty for this information). If you have questions about a medical condition or this instruction, always ask your healthcare professional. [place] [address] any warranty or liability for your use of this information. Leaving the Hospital After Treatment for COVID-19: Care Instructions  Overview     You are being sent home from the hospital after being treated for COVID-19. Being in the hospital can be hard, especially if you've been in the intensive care unit (ICU). Even though you're going home, you probably don't feel well yet. Healing from COVID-19 takes time.  You may feel very tired for weeks or months afterward, especially if you were on a ventilator. It will take time to get back to your old level of activity. Some people may have long-lasting health problems. But most people can look forward to feeling a little better every day. If you were on a ventilator, your throat may be sore and your voice hoarse or raspy for a while. After leaving the hospital, some people have feelings of anxiety and depression. They may have nightmares. Or in their mind they may relive events that happened in the hospital (flashbacks). Reach out to your doctor if you're having trouble with these symptoms. Your doctor will tell you if you need to isolate yourself at home, and when you can end isolation. How can you self-isolate when you have COVID-19? If you have COVID-19, there are things you can do to help avoid spreading the virus to others. · Limit contact with people in your home. If possible, stay in a separate bedroom and use a separate bathroom. · Wear a mask when you are around other people. · If you have to leave home, avoid crowds and try to stay at least 6 feet away from other people. · Avoid contact with pets and other animals. · Cover your mouth and nose with a tissue when you cough or sneeze. Then throw it in the trash right away. · Wash your hands often, especially after you cough or sneeze. Use soap and water, and scrub for at least 20 seconds. If soap and water aren't available, use an alcohol-based hand . · Don't share personal household items. These include bedding, towels, cups and glasses, and eating utensils. · [address] in the warmest water allowed for the fabric type, and dry it completely. It's okay to wash other people's laundry with yours. · Clean and disinfect your home. Use household  and disinfectant wipes or sprays. Follow-up care is a key part of your treatment and safety.  Be sure to make and go to all appointments, and call your doctor if you are having problems. It's also a good idea to know your test results and keep a list of the medicines you take. How can you care for yourself at home? · Get plenty of rest. It can help you feel better. · Be kind to yourself if it's taking longer than you expected to feel better. You've been through a stressful time. · Get up and walk around every hour or two while you're resting. Slowly increase your activity as you start to feel better. · Eat healthy foods. · Drink plenty of fluids. If you have kidney, heart, or liver disease and have to limit fluids, talk with your doctor before you increase the amount of fluids you drink. · If needed, take acetaminophen (Tylenol) or ibuprofen (Advil, Motrin) to reduce a fever. It may also help with muscle aches. Read and follow all instructions on the label. When should you call for help? Call 911 anytime you think you may need emergency care. For example, call if you have life-threatening symptoms, such as:    · You have severe trouble breathing. (You can't talk at all.)     · You have constant chest pain or pressure.     · You are severely dizzy or lightheaded.     · You are confused or can't think clearly.     · You have pale, gray, or blue-colored skin or lips.     · You pass out (lose consciousness) or are very hard to wake up. Call your doctor now or seek immediate medical care if:    · You have moderate trouble breathing. (You can't speak a full sentence.)     · You are coughing up blood (more than about 1 teaspoon).     · You have signs of low blood pressure. These include feeling lightheaded; being too weak to stand; and having cold, pale, clammy skin. Watch closely for changes in your health, and be sure to contact your doctor if:    · Your symptoms get worse.     · You are not getting better as expected.     · You have new or worse symptoms of anxiety, depression, nightmares, or flashbacks. Call before you go to the doctor's office.   Follow their instructions. And wear a mask. Current as of: July 1, 2021               Content Version: 13.0  © 7706-4301 [employer]. Care instructions adapted under license by [employer] (which disclaims liability or warranty for this information). If you have questions about a medical condition or this instruction, always ask your healthcare professional. [address] any warranty or liability for your use of this information. and appropriate educational materials and side effects teaching were provided.   ___________________________________________________________________________________________________________________________________

## 2021-10-01 ENCOUNTER — HOME CARE VISIT (OUTPATIENT)
Dept: SCHEDULING | Facility: HOME HEALTH | Age: 46
End: 2021-10-01
Payer: COMMERCIAL

## 2021-10-01 VITALS
RESPIRATION RATE: 18 BRPM | OXYGEN SATURATION: 92 % | TEMPERATURE: 97.2 F | SYSTOLIC BLOOD PRESSURE: 118 MMHG | HEART RATE: 88 BPM | DIASTOLIC BLOOD PRESSURE: 78 MMHG

## 2021-10-01 PROBLEM — J12.82 PNEUMONIA DUE TO COVID-19 VIRUS: Status: ACTIVE | Noted: 2021-09-27

## 2021-10-01 PROCEDURE — 400013 HH SOC

## 2021-10-01 PROCEDURE — G0151 HHCP-SERV OF PT,EA 15 MIN: HCPCS

## 2021-10-02 ENCOUNTER — HOME CARE VISIT (OUTPATIENT)
Dept: SCHEDULING | Facility: HOME HEALTH | Age: 46
End: 2021-10-02
Payer: COMMERCIAL

## 2021-10-02 VITALS
TEMPERATURE: 97.6 F | SYSTOLIC BLOOD PRESSURE: 132 MMHG | RESPIRATION RATE: 18 BRPM | OXYGEN SATURATION: 90 % | HEART RATE: 97 BPM | DIASTOLIC BLOOD PRESSURE: 64 MMHG

## 2021-10-02 PROCEDURE — G0299 HHS/HOSPICE OF RN EA 15 MIN: HCPCS

## 2021-10-04 ENCOUNTER — HOME CARE VISIT (OUTPATIENT)
Dept: SCHEDULING | Facility: HOME HEALTH | Age: 46
End: 2021-10-04
Payer: COMMERCIAL

## 2021-10-04 PROCEDURE — G0299 HHS/HOSPICE OF RN EA 15 MIN: HCPCS

## 2021-10-05 ENCOUNTER — HOME CARE VISIT (OUTPATIENT)
Dept: SCHEDULING | Facility: HOME HEALTH | Age: 46
End: 2021-10-05
Payer: COMMERCIAL

## 2021-10-05 VITALS
OXYGEN SATURATION: 93 % | RESPIRATION RATE: 18 BRPM | SYSTOLIC BLOOD PRESSURE: 138 MMHG | DIASTOLIC BLOOD PRESSURE: 84 MMHG

## 2021-10-05 PROCEDURE — G0151 HHCP-SERV OF PT,EA 15 MIN: HCPCS

## 2021-10-06 ENCOUNTER — HOME CARE VISIT (OUTPATIENT)
Dept: SCHEDULING | Facility: HOME HEALTH | Age: 46
End: 2021-10-06
Payer: COMMERCIAL

## 2021-10-06 VITALS
DIASTOLIC BLOOD PRESSURE: 80 MMHG | RESPIRATION RATE: 18 BRPM | SYSTOLIC BLOOD PRESSURE: 128 MMHG | TEMPERATURE: 98.1 F | HEART RATE: 90 BPM | OXYGEN SATURATION: 92 %

## 2021-10-06 PROCEDURE — G0152 HHCP-SERV OF OT,EA 15 MIN: HCPCS

## 2021-10-06 PROCEDURE — G0299 HHS/HOSPICE OF RN EA 15 MIN: HCPCS

## 2021-10-07 ENCOUNTER — HOME CARE VISIT (OUTPATIENT)
Dept: SCHEDULING | Facility: HOME HEALTH | Age: 46
End: 2021-10-07
Payer: COMMERCIAL

## 2021-10-07 VITALS
SYSTOLIC BLOOD PRESSURE: 120 MMHG | TEMPERATURE: 97.9 F | HEART RATE: 75 BPM | DIASTOLIC BLOOD PRESSURE: 68 MMHG | RESPIRATION RATE: 17 BRPM | OXYGEN SATURATION: 97 %

## 2021-10-07 VITALS
SYSTOLIC BLOOD PRESSURE: 124 MMHG | OXYGEN SATURATION: 95 % | DIASTOLIC BLOOD PRESSURE: 70 MMHG | RESPIRATION RATE: 18 BRPM | TEMPERATURE: 96.9 F | HEART RATE: 80 BPM

## 2021-10-07 PROCEDURE — G0151 HHCP-SERV OF PT,EA 15 MIN: HCPCS

## 2021-10-08 ENCOUNTER — HOME CARE VISIT (OUTPATIENT)
Dept: SCHEDULING | Facility: HOME HEALTH | Age: 46
End: 2021-10-08
Payer: COMMERCIAL

## 2021-10-08 VITALS
OXYGEN SATURATION: 92 % | SYSTOLIC BLOOD PRESSURE: 138 MMHG | TEMPERATURE: 97.9 F | RESPIRATION RATE: 18 BRPM | DIASTOLIC BLOOD PRESSURE: 78 MMHG | HEART RATE: 81 BPM

## 2021-10-08 PROBLEM — E66.01 MORBID OBESITY WITH BMI OF 45.0-49.9, ADULT (HCC): Status: ACTIVE | Noted: 2021-09-26

## 2021-10-08 PROCEDURE — G0299 HHS/HOSPICE OF RN EA 15 MIN: HCPCS

## 2021-10-11 ENCOUNTER — HOME CARE VISIT (OUTPATIENT)
Dept: SCHEDULING | Facility: HOME HEALTH | Age: 46
End: 2021-10-11
Payer: COMMERCIAL

## 2021-10-11 VITALS
OXYGEN SATURATION: 94 % | HEART RATE: 94 BPM | TEMPERATURE: 97.9 F | DIASTOLIC BLOOD PRESSURE: 64 MMHG | SYSTOLIC BLOOD PRESSURE: 118 MMHG | RESPIRATION RATE: 17 BRPM

## 2021-10-11 VITALS
SYSTOLIC BLOOD PRESSURE: 118 MMHG | DIASTOLIC BLOOD PRESSURE: 75 MMHG | OXYGEN SATURATION: 92 % | TEMPERATURE: 97.8 F | HEART RATE: 110 BPM | RESPIRATION RATE: 18 BRPM

## 2021-10-11 PROCEDURE — G0151 HHCP-SERV OF PT,EA 15 MIN: HCPCS

## 2021-10-11 PROCEDURE — G0299 HHS/HOSPICE OF RN EA 15 MIN: HCPCS

## 2021-10-13 ENCOUNTER — HOME CARE VISIT (OUTPATIENT)
Dept: SCHEDULING | Facility: HOME HEALTH | Age: 46
End: 2021-10-13
Payer: COMMERCIAL

## 2021-10-13 VITALS
DIASTOLIC BLOOD PRESSURE: 77 MMHG | RESPIRATION RATE: 18 BRPM | HEART RATE: 89 BPM | OXYGEN SATURATION: 93 % | TEMPERATURE: 98.2 F | SYSTOLIC BLOOD PRESSURE: 119 MMHG

## 2021-10-13 PROCEDURE — G0151 HHCP-SERV OF PT,EA 15 MIN: HCPCS

## 2021-10-14 ENCOUNTER — HOME CARE VISIT (OUTPATIENT)
Dept: SCHEDULING | Facility: HOME HEALTH | Age: 46
End: 2021-10-14
Payer: COMMERCIAL

## 2021-10-14 VITALS
DIASTOLIC BLOOD PRESSURE: 82 MMHG | SYSTOLIC BLOOD PRESSURE: 118 MMHG | OXYGEN SATURATION: 96 % | TEMPERATURE: 98.1 F | HEART RATE: 92 BPM

## 2021-10-14 PROCEDURE — G0158 HHC OT ASSISTANT EA 15: HCPCS

## 2021-10-15 ENCOUNTER — HOME CARE VISIT (OUTPATIENT)
Dept: SCHEDULING | Facility: HOME HEALTH | Age: 46
End: 2021-10-15
Payer: COMMERCIAL

## 2021-10-15 PROCEDURE — G0299 HHS/HOSPICE OF RN EA 15 MIN: HCPCS

## 2021-10-18 ENCOUNTER — HOME CARE VISIT (OUTPATIENT)
Dept: SCHEDULING | Facility: HOME HEALTH | Age: 46
End: 2021-10-18
Payer: COMMERCIAL

## 2021-10-18 VITALS
RESPIRATION RATE: 17 BRPM | DIASTOLIC BLOOD PRESSURE: 72 MMHG | OXYGEN SATURATION: 97 % | HEART RATE: 69 BPM | SYSTOLIC BLOOD PRESSURE: 130 MMHG | TEMPERATURE: 98.2 F

## 2021-10-18 PROCEDURE — G0299 HHS/HOSPICE OF RN EA 15 MIN: HCPCS

## 2021-10-21 ENCOUNTER — HOME CARE VISIT (OUTPATIENT)
Dept: SCHEDULING | Facility: HOME HEALTH | Age: 46
End: 2021-10-21
Payer: COMMERCIAL

## 2021-10-21 VITALS
SYSTOLIC BLOOD PRESSURE: 128 MMHG | HEART RATE: 72 BPM | DIASTOLIC BLOOD PRESSURE: 72 MMHG | RESPIRATION RATE: 17 BRPM | OXYGEN SATURATION: 98 % | TEMPERATURE: 98.2 F

## 2021-10-21 PROCEDURE — G0152 HHCP-SERV OF OT,EA 15 MIN: HCPCS

## 2021-10-22 ENCOUNTER — HOME CARE VISIT (OUTPATIENT)
Dept: SCHEDULING | Facility: HOME HEALTH | Age: 46
End: 2021-10-22
Payer: COMMERCIAL

## 2021-10-22 VITALS
TEMPERATURE: 97.9 F | HEART RATE: 81 BPM | RESPIRATION RATE: 17 BRPM | DIASTOLIC BLOOD PRESSURE: 78 MMHG | OXYGEN SATURATION: 97 % | SYSTOLIC BLOOD PRESSURE: 136 MMHG

## 2021-10-22 VITALS
DIASTOLIC BLOOD PRESSURE: 62 MMHG | TEMPERATURE: 98.6 F | OXYGEN SATURATION: 93 % | RESPIRATION RATE: 18 BRPM | SYSTOLIC BLOOD PRESSURE: 128 MMHG | HEART RATE: 64 BPM

## 2021-10-22 PROCEDURE — G0299 HHS/HOSPICE OF RN EA 15 MIN: HCPCS

## 2021-11-12 PROBLEM — U07.1 COVID-19: Status: RESOLVED | Noted: 2021-09-26 | Resolved: 2021-11-12

## 2021-11-12 PROBLEM — Z86.16 HISTORY OF COVID-19: Status: ACTIVE | Noted: 2021-11-12

## 2021-11-12 PROBLEM — F41.1 GAD (GENERALIZED ANXIETY DISORDER): Status: ACTIVE | Noted: 2021-11-12

## 2021-11-12 PROBLEM — R41.840 ATTENTION DEFICIT: Status: ACTIVE | Noted: 2021-11-12

## 2021-11-12 PROBLEM — K21.9 GASTROESOPHAGEAL REFLUX DISEASE WITHOUT ESOPHAGITIS: Status: ACTIVE | Noted: 2021-11-12

## 2021-11-12 PROBLEM — R06.09 DYSPNEA ON EXERTION: Status: ACTIVE | Noted: 2021-11-12

## 2021-12-07 ENCOUNTER — HOSPITAL ENCOUNTER (OUTPATIENT)
Dept: SLEEP MEDICINE | Age: 46
Discharge: HOME | End: 2021-12-07
Payer: COMMERCIAL

## 2021-12-07 PROCEDURE — 95806 SLEEP STUDY UNATT&RESP EFFT: CPT

## 2022-01-11 ENCOUNTER — HOSPITAL ENCOUNTER (OUTPATIENT)
Dept: SLEEP MEDICINE | Age: 47
Discharge: HOME | End: 2022-01-11
Payer: COMMERCIAL

## 2022-01-11 PROCEDURE — 95811 POLYSOM 6/>YRS CPAP 4/> PARM: CPT

## 2022-03-18 PROBLEM — R06.09 DYSPNEA ON EXERTION: Status: ACTIVE | Noted: 2021-11-12

## 2022-03-18 PROBLEM — U07.1 PNEUMONIA DUE TO COVID-19 VIRUS: Status: ACTIVE | Noted: 2021-09-27

## 2022-03-18 PROBLEM — Z86.16 HISTORY OF COVID-19: Status: ACTIVE | Noted: 2021-11-12

## 2022-03-18 PROBLEM — J12.82 PNEUMONIA DUE TO COVID-19 VIRUS: Status: ACTIVE | Noted: 2021-09-27

## 2022-03-19 PROBLEM — F41.1 GAD (GENERALIZED ANXIETY DISORDER): Status: ACTIVE | Noted: 2021-11-12

## 2022-03-19 PROBLEM — R41.840 ATTENTION DEFICIT: Status: ACTIVE | Noted: 2021-11-12

## 2022-03-19 PROBLEM — E66.01 MORBID OBESITY WITH BMI OF 45.0-49.9, ADULT (HCC): Status: ACTIVE | Noted: 2021-09-26

## 2022-03-19 PROBLEM — K21.9 GASTROESOPHAGEAL REFLUX DISEASE WITHOUT ESOPHAGITIS: Status: ACTIVE | Noted: 2021-11-12

## 2022-03-19 PROBLEM — R09.02 HYPOXIA: Status: ACTIVE | Noted: 2021-09-26
